# Patient Record
Sex: FEMALE | Race: WHITE
[De-identification: names, ages, dates, MRNs, and addresses within clinical notes are randomized per-mention and may not be internally consistent; named-entity substitution may affect disease eponyms.]

---

## 2019-10-22 RX ORDER — SODIUM PICOSULFATE, MAGNESIUM OXIDE, AND ANHYDROUS CITRIC ACID 10; 3.5; 12 MG/160ML; G/160ML; G/160ML
10-3.5-12 MG-GM LIQUID ORAL
Qty: 1 | Refills: 0 | Status: ACTIVE | COMMUNITY
Start: 2019-10-22 | End: 1900-01-01

## 2019-10-24 DIAGNOSIS — Z86.010 PERSONAL HISTORY OF COLONIC POLYPS: ICD-10-CM

## 2019-10-24 PROBLEM — Z86.018 HISTORY OF UTERINE LEIOMYOMA: Status: RESOLVED | Noted: 2019-10-24 | Resolved: 2019-10-24

## 2019-10-24 PROBLEM — H52.10 MYOPIA: Status: ACTIVE | Noted: 2019-10-24

## 2019-10-24 PROBLEM — R00.0 SINUS TACHYCARDIA: Status: ACTIVE | Noted: 2019-10-24

## 2019-10-24 PROBLEM — F41.9 ANXIETY: Status: ACTIVE | Noted: 2019-10-24

## 2019-10-24 PROBLEM — Z87.81 HISTORY OF FRACTURE OF ANKLE: Status: RESOLVED | Noted: 2019-10-24 | Resolved: 2019-10-24

## 2019-10-24 RX ORDER — ALENDRONATE SODIUM 70 MG/1
70 TABLET ORAL
Refills: 0 | Status: ACTIVE | COMMUNITY

## 2019-10-24 RX ORDER — ADHESIVE TAPE 3"X 2.3 YD
50 MCG TAPE, NON-MEDICATED TOPICAL
Refills: 0 | Status: ACTIVE | COMMUNITY

## 2019-10-28 ENCOUNTER — APPOINTMENT (OUTPATIENT)
Dept: SURGERY | Facility: CLINIC | Age: 69
End: 2019-10-28

## 2019-10-28 ENCOUNTER — APPOINTMENT (OUTPATIENT)
Dept: SURGERY | Facility: CLINIC | Age: 69
End: 2019-10-28
Payer: COMMERCIAL

## 2019-10-28 DIAGNOSIS — R00.0 TACHYCARDIA, UNSPECIFIED: ICD-10-CM

## 2019-10-28 DIAGNOSIS — H52.10 MYOPIA, UNSPECIFIED EYE: ICD-10-CM

## 2019-10-28 DIAGNOSIS — Z87.81 PERSONAL HISTORY OF (HEALED) TRAUMATIC FRACTURE: ICD-10-CM

## 2019-10-28 DIAGNOSIS — F41.9 ANXIETY DISORDER, UNSPECIFIED: ICD-10-CM

## 2019-10-28 DIAGNOSIS — Z86.018 PERSONAL HISTORY OF OTHER BENIGN NEOPLASM: ICD-10-CM

## 2019-10-28 PROCEDURE — 45385 COLONOSCOPY W/LESION REMOVAL: CPT

## 2022-10-12 DIAGNOSIS — Z12.11 ENCOUNTER FOR SCREENING FOR MALIGNANT NEOPLASM OF COLON: ICD-10-CM

## 2022-10-13 LAB — SARS-COV-2 N GENE NPH QL NAA+PROBE: NOT DETECTED

## 2022-10-14 RX ORDER — TAMOXIFEN CITRATE 20 MG/1
TABLET, FILM COATED ORAL
Refills: 0 | Status: ACTIVE | COMMUNITY

## 2022-10-14 RX ORDER — OXYCODONE HYDROCHLORIDE 30 MG/1
TABLET ORAL
Refills: 0 | Status: ACTIVE | COMMUNITY

## 2022-10-18 ENCOUNTER — APPOINTMENT (OUTPATIENT)
Dept: SURGERY | Facility: CLINIC | Age: 72
End: 2022-10-18

## 2022-10-18 PROCEDURE — 45378 DIAGNOSTIC COLONOSCOPY: CPT

## 2022-11-28 ENCOUNTER — INPATIENT (INPATIENT)
Facility: HOSPITAL | Age: 72
LOS: 2 days | Discharge: NURSING, REHAB OR LONG-TERM CARE | DRG: 871 | End: 2022-12-01
Attending: HOSPITALIST | Admitting: FAMILY MEDICINE
Payer: MEDICARE

## 2022-11-28 VITALS — WEIGHT: 138.01 LBS | HEIGHT: 63 IN

## 2022-11-28 DIAGNOSIS — Z90.710 ACQUIRED ABSENCE OF BOTH CERVIX AND UTERUS: Chronic | ICD-10-CM

## 2022-11-28 DIAGNOSIS — Z98.890 OTHER SPECIFIED POSTPROCEDURAL STATES: Chronic | ICD-10-CM

## 2022-11-28 DIAGNOSIS — J18.9 PNEUMONIA, UNSPECIFIED ORGANISM: ICD-10-CM

## 2022-11-28 LAB
ADD ON TEST-SPECIMEN IN LAB: SIGNIFICANT CHANGE UP
ALBUMIN SERPL ELPH-MCNC: 2.6 G/DL — LOW (ref 3.3–5)
ALP SERPL-CCNC: 37 U/L — LOW (ref 40–120)
ALT FLD-CCNC: 18 U/L — SIGNIFICANT CHANGE UP (ref 12–78)
ANION GAP SERPL CALC-SCNC: 3 MMOL/L — LOW (ref 5–17)
APPEARANCE UR: ABNORMAL
APTT BLD: 30 SEC — SIGNIFICANT CHANGE UP (ref 27.5–35.5)
AST SERPL-CCNC: 10 U/L — LOW (ref 15–37)
BASE EXCESS BLDV CALC-SCNC: 0.8 MMOL/L — SIGNIFICANT CHANGE UP
BASOPHILS # BLD AUTO: 0.08 K/UL — SIGNIFICANT CHANGE UP (ref 0–0.2)
BASOPHILS NFR BLD AUTO: 0.3 % — SIGNIFICANT CHANGE UP (ref 0–2)
BILIRUB SERPL-MCNC: 0.2 MG/DL — SIGNIFICANT CHANGE UP (ref 0.2–1.2)
BILIRUB UR-MCNC: NEGATIVE — SIGNIFICANT CHANGE UP
BUN SERPL-MCNC: 13 MG/DL — SIGNIFICANT CHANGE UP (ref 7–23)
CALCIUM SERPL-MCNC: 8.9 MG/DL — SIGNIFICANT CHANGE UP (ref 8.5–10.1)
CHLORIDE SERPL-SCNC: 100 MMOL/L — SIGNIFICANT CHANGE UP (ref 96–108)
CO2 BLDV-SCNC: 29 MMOL/L — HIGH (ref 22–26)
CO2 SERPL-SCNC: 31 MMOL/L — SIGNIFICANT CHANGE UP (ref 22–31)
COLOR SPEC: YELLOW — SIGNIFICANT CHANGE UP
CREAT SERPL-MCNC: 0.78 MG/DL — SIGNIFICANT CHANGE UP (ref 0.5–1.3)
DIFF PNL FLD: NEGATIVE — SIGNIFICANT CHANGE UP
EGFR: 82 ML/MIN/1.73M2 — SIGNIFICANT CHANGE UP
EOSINOPHIL # BLD AUTO: 0 K/UL — SIGNIFICANT CHANGE UP (ref 0–0.5)
EOSINOPHIL NFR BLD AUTO: 0 % — SIGNIFICANT CHANGE UP (ref 0–6)
GLUCOSE SERPL-MCNC: 157 MG/DL — HIGH (ref 70–99)
GLUCOSE UR QL: NEGATIVE — SIGNIFICANT CHANGE UP
HCO3 BLDV-SCNC: 27 MMOL/L — SIGNIFICANT CHANGE UP (ref 22–29)
HCT VFR BLD CALC: 38.2 % — SIGNIFICANT CHANGE UP (ref 34.5–45)
HGB BLD-MCNC: 12.5 G/DL — SIGNIFICANT CHANGE UP (ref 11.5–15.5)
IMM GRANULOCYTES NFR BLD AUTO: 0.5 % — SIGNIFICANT CHANGE UP (ref 0–0.9)
INR BLD: 1.23 RATIO — HIGH (ref 0.88–1.16)
KETONES UR-MCNC: NEGATIVE — SIGNIFICANT CHANGE UP
LACTATE SERPL-SCNC: 2 MMOL/L — SIGNIFICANT CHANGE UP (ref 0.7–2)
LACTATE SERPL-SCNC: 2.8 MMOL/L — HIGH (ref 0.7–2)
LEUKOCYTE ESTERASE UR-ACNC: ABNORMAL
LYMPHOCYTES # BLD AUTO: 1.06 K/UL — SIGNIFICANT CHANGE UP (ref 1–3.3)
LYMPHOCYTES # BLD AUTO: 4.3 % — LOW (ref 13–44)
MCHC RBC-ENTMCNC: 31.5 PG — SIGNIFICANT CHANGE UP (ref 27–34)
MCHC RBC-ENTMCNC: 32.7 GM/DL — SIGNIFICANT CHANGE UP (ref 32–36)
MCV RBC AUTO: 96.2 FL — SIGNIFICANT CHANGE UP (ref 80–100)
MONOCYTES # BLD AUTO: 2.27 K/UL — HIGH (ref 0–0.9)
MONOCYTES NFR BLD AUTO: 9.2 % — SIGNIFICANT CHANGE UP (ref 2–14)
NEUTROPHILS # BLD AUTO: 21.05 K/UL — HIGH (ref 1.8–7.4)
NEUTROPHILS NFR BLD AUTO: 85.7 % — HIGH (ref 43–77)
NITRITE UR-MCNC: NEGATIVE — SIGNIFICANT CHANGE UP
PCO2 BLDV: 49 MMHG — HIGH (ref 39–42)
PH BLDV: 7.35 — SIGNIFICANT CHANGE UP (ref 7.32–7.43)
PH UR: 6.5 — SIGNIFICANT CHANGE UP (ref 5–8)
PLATELET # BLD AUTO: 332 K/UL — SIGNIFICANT CHANGE UP (ref 150–400)
PO2 BLDV: 37 MMHG — SIGNIFICANT CHANGE UP
POTASSIUM SERPL-MCNC: 4.5 MMOL/L — SIGNIFICANT CHANGE UP (ref 3.5–5.3)
POTASSIUM SERPL-SCNC: 4.5 MMOL/L — SIGNIFICANT CHANGE UP (ref 3.5–5.3)
PROT SERPL-MCNC: 6.2 GM/DL — SIGNIFICANT CHANGE UP (ref 6–8.3)
PROT UR-MCNC: 15
PROTHROM AB SERPL-ACNC: 14.3 SEC — HIGH (ref 10.5–13.4)
RBC # BLD: 3.97 M/UL — SIGNIFICANT CHANGE UP (ref 3.8–5.2)
RBC # FLD: 12.9 % — SIGNIFICANT CHANGE UP (ref 10.3–14.5)
SAO2 % BLDV: 69.8 % — SIGNIFICANT CHANGE UP
SODIUM SERPL-SCNC: 134 MMOL/L — LOW (ref 135–145)
SP GR SPEC: 1 — LOW (ref 1.01–1.02)
UROBILINOGEN FLD QL: NEGATIVE — SIGNIFICANT CHANGE UP
WBC # BLD: 24.59 K/UL — HIGH (ref 3.8–10.5)
WBC # FLD AUTO: 24.59 K/UL — HIGH (ref 3.8–10.5)

## 2022-11-28 PROCEDURE — 81001 URINALYSIS AUTO W/SCOPE: CPT

## 2022-11-28 PROCEDURE — 80048 BASIC METABOLIC PNL TOTAL CA: CPT

## 2022-11-28 PROCEDURE — 87086 URINE CULTURE/COLONY COUNT: CPT

## 2022-11-28 PROCEDURE — 83605 ASSAY OF LACTIC ACID: CPT

## 2022-11-28 PROCEDURE — 85025 COMPLETE CBC W/AUTO DIFF WBC: CPT

## 2022-11-28 PROCEDURE — 93010 ELECTROCARDIOGRAM REPORT: CPT

## 2022-11-28 PROCEDURE — 97162 PT EVAL MOD COMPLEX 30 MIN: CPT | Mod: GP

## 2022-11-28 PROCEDURE — 36415 COLL VENOUS BLD VENIPUNCTURE: CPT

## 2022-11-28 PROCEDURE — 82803 BLOOD GASES ANY COMBINATION: CPT

## 2022-11-28 PROCEDURE — 97116 GAIT TRAINING THERAPY: CPT | Mod: GP

## 2022-11-28 PROCEDURE — 71275 CT ANGIOGRAPHY CHEST: CPT | Mod: 26,MD

## 2022-11-28 PROCEDURE — 70450 CT HEAD/BRAIN W/O DYE: CPT | Mod: 26,MD

## 2022-11-28 PROCEDURE — 83036 HEMOGLOBIN GLYCOSYLATED A1C: CPT

## 2022-11-28 PROCEDURE — 86803 HEPATITIS C AB TEST: CPT

## 2022-11-28 PROCEDURE — 99223 1ST HOSP IP/OBS HIGH 75: CPT

## 2022-11-28 PROCEDURE — 99285 EMERGENCY DEPT VISIT HI MDM: CPT | Mod: CS

## 2022-11-28 RX ORDER — SODIUM CHLORIDE 9 MG/ML
2000 INJECTION INTRAMUSCULAR; INTRAVENOUS; SUBCUTANEOUS ONCE
Refills: 0 | Status: COMPLETED | OUTPATIENT
Start: 2022-11-28 | End: 2022-11-28

## 2022-11-28 RX ORDER — SODIUM CHLORIDE 9 MG/ML
1000 INJECTION INTRAMUSCULAR; INTRAVENOUS; SUBCUTANEOUS
Refills: 0 | Status: DISCONTINUED | OUTPATIENT
Start: 2022-11-28 | End: 2022-12-01

## 2022-11-28 RX ORDER — LANOLIN ALCOHOL/MO/W.PET/CERES
3 CREAM (GRAM) TOPICAL AT BEDTIME
Refills: 0 | Status: DISCONTINUED | OUTPATIENT
Start: 2022-11-28 | End: 2022-12-01

## 2022-11-28 RX ORDER — VANCOMYCIN HCL 1 G
1000 VIAL (EA) INTRAVENOUS ONCE
Refills: 0 | Status: COMPLETED | OUTPATIENT
Start: 2022-11-28 | End: 2022-11-28

## 2022-11-28 RX ORDER — POLYETHYLENE GLYCOL 3350 17 G/17G
17 POWDER, FOR SOLUTION ORAL DAILY
Refills: 0 | Status: DISCONTINUED | OUTPATIENT
Start: 2022-11-28 | End: 2022-12-01

## 2022-11-28 RX ORDER — GABAPENTIN 400 MG/1
800 CAPSULE ORAL DAILY
Refills: 0 | Status: DISCONTINUED | OUTPATIENT
Start: 2022-11-28 | End: 2022-12-01

## 2022-11-28 RX ORDER — OXYCODONE HYDROCHLORIDE 5 MG/1
15 TABLET ORAL
Refills: 0 | Status: DISCONTINUED | OUTPATIENT
Start: 2022-11-28 | End: 2022-12-01

## 2022-11-28 RX ORDER — CEFTRIAXONE 500 MG/1
1000 INJECTION, POWDER, FOR SOLUTION INTRAMUSCULAR; INTRAVENOUS EVERY 24 HOURS
Refills: 0 | Status: DISCONTINUED | OUTPATIENT
Start: 2022-11-28 | End: 2022-12-01

## 2022-11-28 RX ORDER — ENOXAPARIN SODIUM 100 MG/ML
40 INJECTION SUBCUTANEOUS EVERY 24 HOURS
Refills: 0 | Status: DISCONTINUED | OUTPATIENT
Start: 2022-11-28 | End: 2022-12-01

## 2022-11-28 RX ORDER — OFLOXACIN 0.3 %
1 DROPS OPHTHALMIC (EYE)
Refills: 0 | Status: DISCONTINUED | OUTPATIENT
Start: 2022-11-28 | End: 2022-12-01

## 2022-11-28 RX ORDER — SENNA PLUS 8.6 MG/1
2 TABLET ORAL AT BEDTIME
Refills: 0 | Status: DISCONTINUED | OUTPATIENT
Start: 2022-11-28 | End: 2022-12-01

## 2022-11-28 RX ORDER — AZITHROMYCIN 500 MG/1
500 TABLET, FILM COATED ORAL EVERY 24 HOURS
Refills: 0 | Status: COMPLETED | OUTPATIENT
Start: 2022-11-28 | End: 2022-11-30

## 2022-11-28 RX ORDER — ACETAMINOPHEN 500 MG
650 TABLET ORAL EVERY 6 HOURS
Refills: 0 | Status: DISCONTINUED | OUTPATIENT
Start: 2022-11-28 | End: 2022-12-01

## 2022-11-28 RX ORDER — TAMOXIFEN CITRATE 20 MG/1
20 TABLET, FILM COATED ORAL AT BEDTIME
Refills: 0 | Status: DISCONTINUED | OUTPATIENT
Start: 2022-11-28 | End: 2022-12-01

## 2022-11-28 RX ORDER — LACTOBACILLUS ACIDOPHILUS 100MM CELL
1 CAPSULE ORAL DAILY
Refills: 0 | Status: DISCONTINUED | OUTPATIENT
Start: 2022-11-28 | End: 2022-12-01

## 2022-11-28 RX ORDER — ERGOCALCIFEROL 1.25 MG/1
50000 CAPSULE ORAL
Refills: 0 | Status: DISCONTINUED | OUTPATIENT
Start: 2022-11-28 | End: 2022-12-01

## 2022-11-28 RX ORDER — ONDANSETRON 8 MG/1
4 TABLET, FILM COATED ORAL EVERY 8 HOURS
Refills: 0 | Status: DISCONTINUED | OUTPATIENT
Start: 2022-11-28 | End: 2022-12-01

## 2022-11-28 RX ORDER — PIPERACILLIN AND TAZOBACTAM 4; .5 G/20ML; G/20ML
3.38 INJECTION, POWDER, LYOPHILIZED, FOR SOLUTION INTRAVENOUS ONCE
Refills: 0 | Status: COMPLETED | OUTPATIENT
Start: 2022-11-28 | End: 2022-11-28

## 2022-11-28 RX ORDER — GABAPENTIN 400 MG/1
0 CAPSULE ORAL
Qty: 0 | Refills: 0 | DISCHARGE

## 2022-11-28 RX ORDER — CELECOXIB 200 MG/1
200 CAPSULE ORAL DAILY
Refills: 0 | Status: DISCONTINUED | OUTPATIENT
Start: 2022-11-28 | End: 2022-12-01

## 2022-11-28 RX ORDER — DIAZEPAM 5 MG
10 TABLET ORAL EVERY 8 HOURS
Refills: 0 | Status: DISCONTINUED | OUTPATIENT
Start: 2022-11-28 | End: 2022-12-01

## 2022-11-28 RX ADMIN — Medication 250 MILLIGRAM(S): at 16:34

## 2022-11-28 RX ADMIN — PIPERACILLIN AND TAZOBACTAM 200 GRAM(S): 4; .5 INJECTION, POWDER, LYOPHILIZED, FOR SOLUTION INTRAVENOUS at 15:13

## 2022-11-28 RX ADMIN — CEFTRIAXONE 1000 MILLIGRAM(S): 500 INJECTION, POWDER, FOR SOLUTION INTRAMUSCULAR; INTRAVENOUS at 23:15

## 2022-11-28 RX ADMIN — SENNA PLUS 2 TABLET(S): 8.6 TABLET ORAL at 23:04

## 2022-11-28 RX ADMIN — TAMOXIFEN CITRATE 20 MILLIGRAM(S): 20 TABLET, FILM COATED ORAL at 23:06

## 2022-11-28 RX ADMIN — SODIUM CHLORIDE 1000 MILLILITER(S): 9 INJECTION INTRAMUSCULAR; INTRAVENOUS; SUBCUTANEOUS at 15:13

## 2022-11-28 RX ADMIN — Medication 10 MILLIGRAM(S): at 23:04

## 2022-11-28 RX ADMIN — ENOXAPARIN SODIUM 40 MILLIGRAM(S): 100 INJECTION SUBCUTANEOUS at 23:06

## 2022-11-28 RX ADMIN — SODIUM CHLORIDE 100 MILLILITER(S): 9 INJECTION INTRAMUSCULAR; INTRAVENOUS; SUBCUTANEOUS at 22:04

## 2022-11-28 NOTE — H&P ADULT - HISTORY OF PRESENT ILLNESS
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 Pt had a cold for 8 days last week and today she was feeling more weak. She had an episode of vomiting yesterday. Her  took her SpO2 which ranged from 55-78% at home and her HR ranged from . Her fingernails and lips were purple. Today she had increasing weakness, chills, sweats, subjective fevers, nausea, headache, abdominal distension. She also has pain in the left breast where her lumpectomy was completed and se feels a lump in that area, she will be having a mammogram on December 30th. She has a history of right hip bursitis and is s/p multiple epidural injections and sees pain management. Her  is disabled and cannot take care of her at home and would like for her to be discharged to [facility] after this admission. Denies chest pain, SOB, abdominal pain, N/V, diarrhea/constipation.     ER course: -120, SpO2 85% on room air -> 95% on 3L nasal cannula. Labs: WBC 24.59, neutrophils 85.7%, INR 1.23, lactate 2.8 -> 2.5, Na 134, glucose 157, albumin 2.6. COVID and RVP negative. EKG: Sinus tachycardia with  bom, normal intervals, no ST segment changes, no T wave inversions (personally reviewed).     Imaging:   - CTA chest: 1. No pulmonary thromboembolism. 2. Consolidations in the right middle lobe and left lower lobe which may represent pneumonia in the appropriate setting. Additional areas of atelectasis and/or pneumonia in the lower lungs. A repeat chest CT scan is recommended in 4-6 weeks following treatment to assess for resolution  - CT head: No acute hemorrhage mass or mass effect.    Pt was given Zosyn and Vancomycin, 2L of NS. She is being admitted to med/surg for further management.

## 2022-11-28 NOTE — PATIENT PROFILE ADULT - CENTRAL VENOUS CATHETER/PICC LINE
Has The Cancer Been Biopsied Before?: has been previously biopsied Accession (Optional): [id number] Who Is Your Referring Provider?: Rebecca Rodriguez Np When Was Your Biopsy?: 09/24/2019 no

## 2022-11-28 NOTE — H&P ADULT - ASSESSMENT
71 y/o F presented with low pulse ox     1. Sepsis and acute hypoxic respiratory distress secondary to PNA (r/o bacteremia, UTI)   - Admit to med/surg w/ continuous pulse ox oxygen  - -120, WBC 24.59, lactate 2.8 -> 2.5   - SpO2 85% on room air -> 95% on 3L nasal cannula  - c/w supplemental O2 to maintain SpO2 > 92%   - s/p Zosyn and Vancomycin in ER; c/w Ceftriaxone and Azithromycin   - f/u UCx, BCx x 2, sputum culture, reflex lactic acid; adjust abx based on sensitivities  - CTA: consolidations right middle lobe and left lower lobe suggesting PNA   - Repeat CT chest in 4-6 weeks to assess for resolution   - Trend WBC, monitor for temperatures   - Tylenol for temperatures PRN   - s/p 2L of NS, c/w 100 ml/hr (monitor for fluid overload)   - Monitor BP closely     2. Hyperglycemia   - Glucose 157, monitor closely   - Ordered HbA1c     3. Abdominal distension likely secondary to constipation in the setting of opiate use   - Pt on oxycodone 5 times a day at home, not on a bowel regimen   - Ordered miralax, senna, suppository PRN   - If worsening abdominal distension, worsening pain, or increasing lactic acid will order CT abd/pelvis for further evaluation     4. Left sided breast pain at prior lumpectomy site   - f/u with Oncology and Breast surgeon outpt   - Pt has mammogram scheduled 12/30/22     5. Hypoalbuminemia   - Albumin 2.6   - Nutrition consult     6. History of breast cancer s/p resection, hysterectomy, colon polyps, PNA, diverticular disease, hemorrhoids, anxiety, uterine fibroids  - c/w home medications; verified with pt at the bedside   - Case management/social work consults/PT evaluation     DVT ppx: Lovenox 40 mg subcutaneous daily  Code status: Full code (pt agrees to chest compressions and intubation if required).   Emergency contact/HCP: Alexx Church [phone] 71 y/o F presented with low pulse ox     1. Sepsis and acute hypoxic respiratory distress secondary to PNA (r/o bacteremia, UTI)   - Admit to med/surg w/ continuous pulse ox oxygen  - -120, WBC 24.59, lactate 2.8 -> 2.5   - SpO2 85% on room air -> 95% on 3L nasal cannula  - c/w supplemental O2 to maintain SpO2 > 92%   - s/p Zosyn and Vancomycin in ER; c/w Ceftriaxone and Azithromycin   - f/u UCx, BCx x 2, sputum culture, reflex lactic acid; adjust abx based on sensitivities  - CTA: consolidations right middle lobe and left lower lobe suggesting PNA   - Repeat CT chest in 4-6 weeks to assess for resolution   - Trend WBC, monitor for temperatures   - Tylenol for temperatures PRN   - s/p 2L of NS, c/w 100 ml/hr (monitor for fluid overload)   - Monitor BP closely     2. Hyperglycemia   - Glucose 157, monitor closely   - Ordered HbA1c     3. Abdominal distension likely secondary to constipation in the setting of opiate use   - Pt on oxycodone 5 times a day at home, not on a bowel regimen   - Ordered miralax, senna, suppository PRN   - If worsening abdominal distension, worsening pain, or increasing lactic acid will order CT abd/pelvis for further evaluation     4. Left sided breast pain at prior lumpectomy site   - f/u with Oncology and Breast surgeon outpt   - Pt has mammogram scheduled 12/30/22     5. Bacterial conjunctivitis   - Start Ofloxacin     6. Hypoalbuminemia   - Albumin 2.6   - Nutrition consult     7. History of breast cancer s/p resection, hysterectomy, colon polyps, PNA, diverticular disease, hemorrhoids, anxiety, uterine fibroids  - c/w home medications; verified with pt at the bedside   - Case management/social work consults/PT evaluation     DVT ppx: Lovenox 40 mg subcutaneous daily  Code status: Full code (pt agrees to chest compressions and intubation if required).   Emergency contact/HCP: Alexx Church [phone]

## 2022-11-28 NOTE — H&P ADULT - NSICDXPASTSURGICALHX_GEN_ALL_CORE_FT
PAST SURGICAL HISTORY:  H/O lumpectomy     History of ankle surgery     S/P hysterectomy     S/P myomectomy

## 2022-11-28 NOTE — ED ADULT TRIAGE NOTE - CHIEF COMPLAINT QUOTE
sick x8 days. presents today from home with oxygen levels between 50-80% and HR between .  reports nails were turning purple and circumoral cyanosis noted at home. HR in triage 118, SpO2 82%. + lethargy. pt c/o back pain, anox3 in triage.

## 2022-11-28 NOTE — ED ADULT NURSE REASSESSMENT NOTE - NS ED NURSE REASSESS COMMENT FT1
received pt from RICHARD Pappas. pt resting comfortably in stretcher with  at bedside. pt and  updated on plan of care. pt remains on NC 3L O2 with O2 sat 93-96%.

## 2022-11-28 NOTE — H&P ADULT - NSHPSOCIALHISTORY_GEN_ALL_CORE
Lives with her . Used to be independent with ADLs and IADLs but has been requiring increasing assistance. Denies smoking, alcohol, drug use.

## 2022-11-28 NOTE — ED PROVIDER NOTE - CONSTITUTIONAL NEGATIVE STATEMENT, MLM
Size Of Lesion In Cm (Optional): 2 X Size Of Lesion In Cm (Optional): 0 Detail Level: Detailed no fever and no chills.

## 2022-11-28 NOTE — H&P ADULT - NSHPOUTPATIENTPROVIDERS_GEN_ALL_CORE
PCP/Cardiologist - Dr. Sarwat To   GP - Dr. Maldonado   Pain management - Dr. Baires   Heme/Onc physicians at [facility] -> Breast surgeon Dr. Zuleta, Heme/Onc - Dr. Lombardo, Radiation oncologist - Dr. Marin

## 2022-11-28 NOTE — ED PROVIDER NOTE - PROGRESS NOTE DETAILS
Zamzam Jonas for attending Dr. Anthony:  full 30cc per kg fluid not yet given yet as pt has hx of hypoxia and also clinically having a lot of crackles, will reassess, if tolerating might give more fluid hydration Zamzam Jonas for attending Dr. Anthony:  full 30cc per kg fluid not yet given yet as pt has hx of hypoxia and also clinically having a lot of crackles, will reassess, if tolerating might give more fluid hydration. Gabrielle MESSINA: Spoke with ICU PA/Dr. Paulson. Patient is safe for floor admission. No CICU or ICU needed. s/p abx, fluid, trending lactate. Patient is nontoxic appearing. Spoke with Dr. Duncan. Hospitalist admission appreciated.

## 2022-11-28 NOTE — ED STATDOCS - PROGRESS NOTE DETAILS
Gabrielle MESSINA: Spoke with ICU PA/Dr. Paulson. Patient is safe for flood admission. No CICU or ICU needed. s/p abx, fluid, trending lactate. Patient is nontoxic appearing. Spoke with Dr. Duncan. Hospitalist admission appreciated.

## 2022-11-28 NOTE — ED PROVIDER NOTE - GASTROINTESTINAL, MLM
Abdomen soft, non-tender, no guarding. Abd mildly distended. Abdomen soft, non-tender, no guarding. Abd mildly distended. BS+

## 2022-11-28 NOTE — ED PROVIDER NOTE - CARDIAC, MLM
Tachycardic, regular rhythm.  Heart sounds S1, S2.  No murmurs, rubs or gallops. Mild LE swelling Tachycardic, regular rhythm.  Heart sounds S1, S2.  No rubs or gallops. Mild LE swelling. 2+ pules in bilateral dp and radial arteries.

## 2022-11-28 NOTE — ED PROVIDER NOTE - CLINICAL SUMMARY MEDICAL DECISION MAKING FREE TEXT BOX
Pt w/ hypoxia and weakness, breast CA immunocompromised and tachycardic today w/ cyanosis alleviated w/ oxygenation. Differential includes PE, PNA, worsening CA load, and sepsis. Spoke w/ pt and spouse who are agreeable for prophylactic ABx treatment, CT, and admission.

## 2022-11-28 NOTE — ED PROVIDER NOTE - MUSCULOSKELETAL, MLM
Spine appears normal, range of motion is not limited, no muscle or joint tenderness Spine appears normal, range of motion is not limited, no muscle or joint tenderness. 5/5 strength on flexion and extensions.

## 2022-11-28 NOTE — ED ADULT NURSE NOTE - OBJECTIVE STATEMENT
71 y/o female awake alert and oriented x4 presents to ED c/o hypoxia and SOB. As per pt's  at bedside, pt was sating 55%-80% on RA at home with HR between .  at bedside reports pt nails were turning blue and extremities were cold. Pt states sx is similar to last time pt had PNA. Denies chest pain, cough, fever/chills. hx breast ca s/p resection, hysterectomy, PNA.

## 2022-11-28 NOTE — H&P ADULT - NSICDXFAMILYHX_GEN_ALL_CORE_FT
FAMILY HISTORY:  Father  Still living? Unknown  FH: CAD (coronary artery disease), Age at diagnosis: Age Unknown    Mother  Still living? Unknown  FH: rheumatic heart disease, Age at diagnosis: Age Unknown    Grandparent  Still living? Unknown  FH: breast cancer, Age at diagnosis: Age Unknown

## 2022-11-28 NOTE — H&P ADULT - NSICDXPASTMEDICALHX_GEN_ALL_CORE_FT
PAST MEDICAL HISTORY:  Bursitis of right hip     Colon polyps     Diverticular disease     H/O hemorrhoids     History of hemorrhoids     Pneumonia     Uterine fibroid       Breast CA left breast s/p lumpectomy on immunotherapy, no chemotherapy or radiation therapy

## 2022-11-28 NOTE — H&P ADULT - NSHPPHYSICALEXAM_GEN_ALL_CORE
ICU Vital Signs Last 24 Hrs  T(C): 36.9 (28 Nov 2022 21:03), Max: 36.9 (28 Nov 2022 21:03)  T(F): 98.4 (28 Nov 2022 21:03), Max: 98.4 (28 Nov 2022 21:03)  HR: 92 (28 Nov 2022 21:03) (92 - 120)  BP: 108/76 (28 Nov 2022 21:03) (104/65 - 108/76)  BP(mean): 78 (28 Nov 2022 15:15) (70 - 82)  RR: 22 (28 Nov 2022 21:03) (16 - 22)  SpO2: 94% (28 Nov 2022 21:03) (85% - 100%)    O2 Parameters below as of 28 Nov 2022 21:03  Patient On (Oxygen Delivery Method): nasal cannula  O2 Flow (L/min): 3    General: Awake and alert, cooperative with exam. No acute distress.   Skin: Warm, dry, and pink.   Eyes: Pupils equal and reactive to light. Extraocular eye movements intact. No conjunctival injection, discharge, or scleral icterus.   HEENT: Atraumatic, normocephalic. Moist mucus membranes.   Breast: left breast with incision clean/dry/intact. Small lump felt under incision likely suture. No other breast masses appreciated on palpation.   Cardiology: Normal S1, S2. No murmurs, rubs, or gallops. Regular rate and rhythm.   Respiratory: Rhonchi diffusely throughout the lung fields. Normal chest expansion. No accessory muscle use.   Gastrointestinal: Positive bowel sounds. Soft, non-tender. + distension. No guarding, rigidity, or rebound tenderness. No hepatosplenomegaly.   Musculoskeletal: 5/5 motor strength in all extremities. Normal range of motion.   Extremities: No peripheral edema bilaterally. Dorsalis pedis pulses 2+ bilaterally.   Neurological: A+Ox3 (person, place, and time). Cranial nerves 2-12 intact. Normal speech. No facial droop. No focal neurological deficits.   Psychiatric: Normal affect. Normal mood. ICU Vital Signs Last 24 Hrs  T(C): 36.9 (28 Nov 2022 21:03), Max: 36.9 (28 Nov 2022 21:03)  T(F): 98.4 (28 Nov 2022 21:03), Max: 98.4 (28 Nov 2022 21:03)  HR: 92 (28 Nov 2022 21:03) (92 - 120)  BP: 108/76 (28 Nov 2022 21:03) (104/65 - 108/76)  BP(mean): 78 (28 Nov 2022 15:15) (70 - 82)  RR: 22 (28 Nov 2022 21:03) (16 - 22)  SpO2: 94% (28 Nov 2022 21:03) (85% - 100%)    O2 Parameters below as of 28 Nov 2022 21:03  Patient On (Oxygen Delivery Method): nasal cannula  O2 Flow (L/min): 3    General: Awake and alert, cooperative with exam. No acute distress.   Skin: Warm, dry, and pink.   Eyes: Pupils equal and reactive to light. Extraocular eye movements intact. Minimal conjunctival erythema, b/l green discharge. No scleral icterus.   HEENT: Atraumatic, normocephalic. Moist mucus membranes.   Breast: left breast with incision clean/dry/intact. Small lump felt under incision likely suture. No other breast masses appreciated on palpation.   Cardiology: Normal S1, S2. No murmurs, rubs, or gallops. Regular rate and rhythm.   Respiratory: Rhonchi diffusely throughout the lung fields. Normal chest expansion. No accessory muscle use.   Gastrointestinal: Positive bowel sounds. Soft, non-tender. + distension. No guarding, rigidity, or rebound tenderness. No hepatosplenomegaly.   Musculoskeletal: 5/5 motor strength in all extremities. Normal range of motion.   Extremities: No peripheral edema bilaterally. Dorsalis pedis pulses 2+ bilaterally.   Neurological: A+Ox3 (person, place, and time). Cranial nerves 2-12 intact. Normal speech. No facial droop. No focal neurological deficits.   Psychiatric: Normal affect. Normal mood.

## 2022-11-28 NOTE — ED PROVIDER NOTE - NS_ ATTENDINGSCRIBEDETAILS _ED_A_ED_FT
I Sandeep Anthony MD saw and examined the patient. Scribe documented for me and under my supervision. I have modified the scribe's documentation where necessary to reflect my history, physical exam and other relevant documentations pertinent to the care of the patient.

## 2022-11-28 NOTE — ED PROVIDER NOTE - OBJECTIVE STATEMENT
73 y/o female w/ a PMHx of breast CA s/p resection, hysterectomy, colon polyp, PNA, diverticular disease, internal hemorrhoids, myopia, anxiety, post concussion syndrome, and uterine leiomyoma presents to the ED for hypoxia. Pt reports O2 level at home were between 50-80% and HR was between  so came to the ED for eval. Pt reports pt nails were turning purple, cold extremities, and pt was noted to have circumoral cyanosis at home. Pt  states Sx are similar to when pt had PNA. Pt  notes pt has been sick w/ a URI for 1 week, tested negative on at home COVID test. No other complaints at this time. NKDA. PCP/Cardio: Dr. Santamaria.

## 2022-11-29 LAB
A1C WITH ESTIMATED AVERAGE GLUCOSE RESULT: 5.8 % — HIGH (ref 4–5.6)
ANION GAP SERPL CALC-SCNC: 4 MMOL/L — LOW (ref 5–17)
BASOPHILS # BLD AUTO: 0.04 K/UL — SIGNIFICANT CHANGE UP (ref 0–0.2)
BASOPHILS NFR BLD AUTO: 0.3 % — SIGNIFICANT CHANGE UP (ref 0–2)
BUN SERPL-MCNC: 6 MG/DL — LOW (ref 7–23)
CALCIUM SERPL-MCNC: 8 MG/DL — LOW (ref 8.5–10.1)
CHLORIDE SERPL-SCNC: 104 MMOL/L — SIGNIFICANT CHANGE UP (ref 96–108)
CO2 SERPL-SCNC: 27 MMOL/L — SIGNIFICANT CHANGE UP (ref 22–31)
CREAT SERPL-MCNC: 0.4 MG/DL — LOW (ref 0.5–1.3)
EGFR: 106 ML/MIN/1.73M2 — SIGNIFICANT CHANGE UP
EOSINOPHIL # BLD AUTO: 0.07 K/UL — SIGNIFICANT CHANGE UP (ref 0–0.5)
EOSINOPHIL NFR BLD AUTO: 0.5 % — SIGNIFICANT CHANGE UP (ref 0–6)
ESTIMATED AVERAGE GLUCOSE: 120 MG/DL — HIGH (ref 68–114)
GLUCOSE SERPL-MCNC: 120 MG/DL — HIGH (ref 70–99)
HCT VFR BLD CALC: 31 % — LOW (ref 34.5–45)
HCV AB S/CO SERPL IA: 0.09 S/CO — SIGNIFICANT CHANGE UP (ref 0–0.99)
HCV AB SERPL-IMP: SIGNIFICANT CHANGE UP
HGB BLD-MCNC: 10.1 G/DL — LOW (ref 11.5–15.5)
IMM GRANULOCYTES NFR BLD AUTO: 0.4 % — SIGNIFICANT CHANGE UP (ref 0–0.9)
LYMPHOCYTES # BLD AUTO: 1.92 K/UL — SIGNIFICANT CHANGE UP (ref 1–3.3)
LYMPHOCYTES # BLD AUTO: 14.5 % — SIGNIFICANT CHANGE UP (ref 13–44)
MCHC RBC-ENTMCNC: 31.3 PG — SIGNIFICANT CHANGE UP (ref 27–34)
MCHC RBC-ENTMCNC: 32.6 GM/DL — SIGNIFICANT CHANGE UP (ref 32–36)
MCV RBC AUTO: 96 FL — SIGNIFICANT CHANGE UP (ref 80–100)
MONOCYTES # BLD AUTO: 1.28 K/UL — HIGH (ref 0–0.9)
MONOCYTES NFR BLD AUTO: 9.7 % — SIGNIFICANT CHANGE UP (ref 2–14)
NEUTROPHILS # BLD AUTO: 9.84 K/UL — HIGH (ref 1.8–7.4)
NEUTROPHILS NFR BLD AUTO: 74.6 % — SIGNIFICANT CHANGE UP (ref 43–77)
PLATELET # BLD AUTO: 260 K/UL — SIGNIFICANT CHANGE UP (ref 150–400)
POTASSIUM SERPL-MCNC: 3.7 MMOL/L — SIGNIFICANT CHANGE UP (ref 3.5–5.3)
POTASSIUM SERPL-SCNC: 3.7 MMOL/L — SIGNIFICANT CHANGE UP (ref 3.5–5.3)
RBC # BLD: 3.23 M/UL — LOW (ref 3.8–5.2)
RBC # FLD: 13.2 % — SIGNIFICANT CHANGE UP (ref 10.3–14.5)
SODIUM SERPL-SCNC: 135 MMOL/L — SIGNIFICANT CHANGE UP (ref 135–145)
WBC # BLD: 13.2 K/UL — HIGH (ref 3.8–10.5)
WBC # FLD AUTO: 13.2 K/UL — HIGH (ref 3.8–10.5)

## 2022-11-29 PROCEDURE — 99232 SBSQ HOSP IP/OBS MODERATE 35: CPT

## 2022-11-29 RX ADMIN — CELECOXIB 200 MILLIGRAM(S): 200 CAPSULE ORAL at 10:19

## 2022-11-29 RX ADMIN — CELECOXIB 200 MILLIGRAM(S): 200 CAPSULE ORAL at 11:15

## 2022-11-29 RX ADMIN — OXYCODONE HYDROCHLORIDE 15 MILLIGRAM(S): 5 TABLET ORAL at 06:31

## 2022-11-29 RX ADMIN — AZITHROMYCIN 255 MILLIGRAM(S): 500 TABLET, FILM COATED ORAL at 00:15

## 2022-11-29 RX ADMIN — OXYCODONE HYDROCHLORIDE 15 MILLIGRAM(S): 5 TABLET ORAL at 00:45

## 2022-11-29 RX ADMIN — OXYCODONE HYDROCHLORIDE 15 MILLIGRAM(S): 5 TABLET ORAL at 00:15

## 2022-11-29 RX ADMIN — Medication 1 TABLET(S): at 10:19

## 2022-11-29 RX ADMIN — ENOXAPARIN SODIUM 40 MILLIGRAM(S): 100 INJECTION SUBCUTANEOUS at 22:17

## 2022-11-29 RX ADMIN — Medication 10 MILLIGRAM(S): at 22:17

## 2022-11-29 RX ADMIN — Medication 1 DROP(S): at 18:04

## 2022-11-29 RX ADMIN — Medication 1 DROP(S): at 00:15

## 2022-11-29 RX ADMIN — OXYCODONE HYDROCHLORIDE 15 MILLIGRAM(S): 5 TABLET ORAL at 11:15

## 2022-11-29 RX ADMIN — OXYCODONE HYDROCHLORIDE 15 MILLIGRAM(S): 5 TABLET ORAL at 15:20

## 2022-11-29 RX ADMIN — SENNA PLUS 2 TABLET(S): 8.6 TABLET ORAL at 22:17

## 2022-11-29 RX ADMIN — Medication 1 DROP(S): at 11:43

## 2022-11-29 RX ADMIN — CEFTRIAXONE 1000 MILLIGRAM(S): 500 INJECTION, POWDER, FOR SOLUTION INTRAMUSCULAR; INTRAVENOUS at 22:18

## 2022-11-29 RX ADMIN — Medication 1 DROP(S): at 05:18

## 2022-11-29 RX ADMIN — OXYCODONE HYDROCHLORIDE 15 MILLIGRAM(S): 5 TABLET ORAL at 16:18

## 2022-11-29 RX ADMIN — OXYCODONE HYDROCHLORIDE 15 MILLIGRAM(S): 5 TABLET ORAL at 10:20

## 2022-11-29 RX ADMIN — POLYETHYLENE GLYCOL 3350 17 GRAM(S): 17 POWDER, FOR SOLUTION ORAL at 10:19

## 2022-11-29 RX ADMIN — Medication 10 MILLIGRAM(S): at 05:18

## 2022-11-29 NOTE — PHYSICAL THERAPY INITIAL EVALUATION ADULT - PERTINENT HX OF CURRENT PROBLEM, REHAB EVAL
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 Pt had a cold for 8 days last week and today she was feeling more weak. She had an episode of vomiting yesterday. Her  took her SpO2 which ranged from 55-78% at home and her HR ranged from . Her fingernails and lips were purple. Today she had increasing weakness, chills, sweats, subjective fevers, nausea, headache, abdominal distension. She also has pain in the left breast where her lumpectomy was completed and se feels a lump in that area, she will be having a mammogram on December 30th. She has a history of right hip bursitis and is s/p multiple epidural injections and sees pain management. Her  is disabled and cannot take care of her at home and would like for her to be discharged to [facility] after this admission.

## 2022-11-29 NOTE — PROGRESS NOTE ADULT - SUBJECTIVE AND OBJECTIVE BOX
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 Pt had a cold for 8 days last week and today she was feeling more weak. She had an episode of vomiting yesterday. Her  took her SpO2 which ranged from 55-78% at home and her HR ranged from . Her fingernails and lips were purple. Today she had increasing weakness, chills, sweats, subjective fevers, nausea, headache, abdominal distension. She also has pain in the left breast where her lumpectomy was completed and se feels a lump in that area, she will be having a mammogram on December 30th. She has a history of right hip bursitis and is s/p multiple epidural injections and sees pain management. Her  is disabled and cannot take care of her at home and would like for her to be discharged to [facility] after this admission. Denies chest pain, SOB, abdominal pain, N/V, diarrhea/constipation.     ER course: -120, SpO2 85% on room air -> 95% on 3L nasal cannula. Labs: WBC 24.59, neutrophils 85.7%, INR 1.23, lactate 2.8 -> 2.5, Na 134, glucose 157, albumin 2.6. COVID and RVP negative. EKG: Sinus tachycardia with  bom, normal intervals, no ST segment changes, no T wave inversions (personally reviewed).     Imaging:   - CTA chest: 1. No pulmonary thromboembolism. 2. Consolidations in the right middle lobe and left lower lobe which may represent pneumonia in the appropriate setting. Additional areas of atelectasis and/or pneumonia in the lower lungs. A repeat chest CT scan is recommended in 4-6 weeks following treatment to assess for resolution  - CT head: No acute hemorrhage mass or mass effect.    Pt was given Zosyn and Vancomycin, 2L of NS. She is being admitted to med/surg for further management. Constitutional: positive for fatigue, positive for subjective fever, positive for chills, negative for decreased appetite.  Skin: negative for rashes, negative for open wounds, negative for jaundice.   Eyes: negative for blurry vision, negative for double vision.   Ears, nose, throat: negative for ear pain, positive for nasal congestion, negative for sore throat, negative for lymph node swelling.   Cardiovascular: negative for chest pain, negative for palpitations, negative for lower extremity swelling.   Respiratory: negative for shortness of breath, negative for wheezing, positive for cough.   Gastrointestinal: negative for abdominal pain, negative for nausea, positive for vomiting, negative for diarrhea, negative for constipation, negative for blood in the stool, negative for black tarry stools, positive for abdominal distension   Genitourinary: negative for burning on urination, negative for urinary urgency or frequency, negative for blood in the urine.   Endocrine: negative for cold intolerance, negative for heat intolerance, negative for increased thirst.   Hematologic: negative for easy bruising or bleeding.   Musculoskeletal: negative for muscle/joint pain, negative for decreased range of motion.   Neurological: negative for dizziness, negative for headaches, negative for loss of consciousness, negative for motor weakness, negative for sensory deficits.   Psychiatric: negative for depression, negative for anxiety.General: Awake and alert, cooperative with exam. No acute distress.   Skin: Warm, dry, and pink.   Eyes: Pupils equal and reactive to light. Extraocular eye movements intact. Minimal conjunctival erythema, b/l green discharge. No scleral icterus.   HEENT: Atraumatic, normocephalic. Moist mucus membranes.   Breast: left breast with incision clean/dry/intact. Small lump felt under incision likely suture. No other breast masses appreciated on palpation.   Cardiology: Normal S1, S2. No murmurs, rubs, or gallops. Regular rate and rhythm.   Respiratory: Rhonchi diffusely throughout the lung fields. Normal chest expansion. No accessory muscle use.   Gastrointestinal: Positive bowel sounds. Soft, non-tender. + distension. No guarding, rigidity, or rebound tenderness. No hepatosplenomegaly.   Musculoskeletal: 5/5 motor strength in all extremities. Normal range of motion.   Extremities: No peripheral edema bilaterally. Dorsalis pedis pulses 2+ bilaterally.   Neurological: A+Ox3 (person, place, and time). Cranial nerves 2-12 intact. Normal speech. No facial droop. No focal neurological deficits.   Psychiatric: Normal affect. Normal mood. 71 y/o F with PMH left breast cancer s/p lumpectomy  (Stage 0, Grade 2 cancer of the milk duct) not on chemotherapy or radiation but taking tamoxifen for immunotherapy, hysterectomy, colon polyps, PNA, diverticular disease, hemorrhoids, anxiety, uterine fibroids s/p myomectomy, right hip bursitis presented with low oxygen at home. Pt had a cold for 8 days last week and today she was feeling more weak. She had an episode of vomiting yesterday. Her  took her SpO2 which ranged from 55-78% at home and her HR ranged from . Her fingernails and lips were purple. Today she had increasing weakness, chills, sweats, subjective fevers, nausea, headache, abdominal distension. She also has pain in the left breast where her lumpectomy was completed and se feels a lump in that area, she will be having a mammogram on . She has a history of right hip bursitis and is s/p multiple epidural injections and sees pain management. Her  is disabled and cannot take care of her at home and would like for her to be discharged to [facility] after this admission. Denies chest pain, SOB, abdominal pain, N/V, diarrhea/constipation.     ER course: -120, SpO2 85% on room air -> 95% on 3L nasal cannula. Labs: WBC 24.59, neutrophils 85.7%, INR 1.23, lactate 2.8 -> 2.5, Na 134, glucose 157, albumin 2.6. COVID and RVP negative. EKG: Sinus tachycardia with  bom, normal intervals, no ST segment changes, no T wave inversions (personally reviewed).     Imaging:   - CTA chest: 1. No pulmonary thromboembolism. 2. Consolidations in the right middle lobe and left lower lobe which may represent pneumonia in the appropriate setting. Additional areas of atelectasis and/or pneumonia in the lower lungs. A repeat chest CT scan is recommended in 4-6 weeks following treatment to assess for resolution  - CT head: No acute hemorrhage mass or mass effect.    Pt was given Zosyn and Vancomycin, 2L of NS. She is being admitted to med/surg for further management.      patient ambulated with PT, currently on 3L, not on home O2 , has cough , but feels better as compared to yesterday overall     ROS  Constitutional: positive for fatigue, positive for subjective fever, positive for chills, negative for decreased appetite.  Skin: negative for rashes, negative for open wounds, negative for jaundice.   Eyes: negative for blurry vision, negative for double vision.   Ears, nose, throat: negative for ear pain, positive for nasal congestion, negative for sore throat, negative for lymph node swelling.   Cardiovascular: negative for chest pain, negative for palpitations, negative for lower extremity swelling.   Respiratory: negative for shortness of breath, negative for wheezing, positive for cough.   Gastrointestinal: negative for abdominal pain, negative for nausea, positive for vomiting, negative for diarrhea, negative for constipation, negative for blood in the stool, negative for black tarry stools, positive for abdominal distension   Genitourinary: negative for burning on urination, negative for urinary urgency or frequency, negative for blood in the urine.   Endocrine: negative for cold intolerance, negative for heat intolerance, negative for increased thirst.   Hematologic: negative for easy bruising or bleeding.   Musculoskeletal: negative for muscle/joint pain, negative for decreased range of motion.   Neurological: negative for dizziness, negative for headaches, negative for loss of consciousness, negative for motor weakness, negative for sensory deficits.   Psychiatric: negative for depression, negative for anxiety.          PHYSICAL EXAM:    Daily     Daily     ICU Vital Signs Last 24 Hrs  T(C): 37.1 (2022 07:55), Max: 37.7 (2022 21:58)  T(F): 98.8 (2022 07:55), Max: 99.9 (2022 21:58)  HR: 79 (2022 07:55) (79 - 98)  BP: 100/68 (2022 07:55) (100/68 - 112/56)  BP(mean): --  ABP: --  ABP(mean): --  RR: 20 (2022 07:55) (20 - 22)  SpO2: 94% (2022 07:55) (94% - 95%)    O2 Parameters below as of 2022 07:55  Patient On (Oxygen Delivery Method): nasal cannula  O2 Flow (L/min): 3      Physical exam   General: Awake and alert, cooperative with exam. No acute distress.   Skin: Warm, dry, and pink.   Eyes: Pupils equal and reactive to light. Extraocular eye movements intact. Minimal conjunctival erythema, b/l green discharge. No scleral icterus.   HEENT: Atraumatic, normocephalic. Moist mucus membranes.   Breast: left breast with incision clean/dry/intact. Small lump felt under incision likely suture. No other breast masses appreciated on palpation.   Cardiology: Normal S1, S2. No murmurs, rubs, or gallops. Regular rate and rhythm.   Respiratory: Rhonchi diffusely throughout the lung fields. Normal chest expansion. No accessory muscle use.   Gastrointestinal: Positive bowel sounds. Soft, non-tender. + distension. No guarding, rigidity, or rebound tenderness. No hepatosplenomegaly.   Musculoskeletal: 5/5 motor strength in all extremities. Normal range of motion.   Extremities: No peripheral edema bilaterally. Dorsalis pedis pulses 2+ bilaterally.   Neurological: A+Ox3 (person, place, and time). Cranial nerves 2-12 intact. Normal speech. No facial droop. No focal neurological deficits.   Psychiatric: Normal affect. Normal mood.                              10.1   13.20 )-----------( 260      ( 2022 07:31 )             31.0       CBC Full  -  ( 2022 07:31 )  WBC Count : 13.20 K/uL  RBC Count : 3.23 M/uL  Hemoglobin : 10.1 g/dL  Hematocrit : 31.0 %  Platelet Count - Automated : 260 K/uL  Mean Cell Volume : 96.0 fl  Mean Cell Hemoglobin : 31.3 pg  Mean Cell Hemoglobin Concentration : 32.6 gm/dL  Auto Neutrophil # : 9.84 K/uL  Auto Lymphocyte # : 1.92 K/uL  Auto Monocyte # : 1.28 K/uL  Auto Eosinophil # : 0.07 K/uL  Auto Basophil # : 0.04 K/uL  Auto Neutrophil % : 74.6 %  Auto Lymphocyte % : 14.5 %  Auto Monocyte % : 9.7 %  Auto Eosinophil % : 0.5 %  Auto Basophil % : 0.3 %          135  |  104  |  6<L>  ----------------------------<  120<H>  3.7   |  27  |  0.40<L>    Ca    8.0<L>      2022 07:31    TPro  6.2  /  Alb  2.6<L>  /  TBili  0.2  /  DBili  x   /  AST  10<L>  /  ALT  18  /  AlkPhos  37<L>  11-28      LIVER FUNCTIONS - ( 2022 14:44 )  Alb: 2.6 g/dL / Pro: 6.2 gm/dL / ALK PHOS: 37 U/L / ALT: 18 U/L / AST: 10 U/L / GGT: x             PT/INR - ( 2022 14:44 )   PT: 14.3 sec;   INR: 1.23 ratio         PTT - ( 2022 14:44 )  PTT:30.0 sec          Urinalysis Basic - ( 2022 20:27 )    Color: Yellow / Appearance: Slightly Turbid / S.005 / pH: x  Gluc: x / Ketone: Negative  / Bili: Negative / Urobili: Negative   Blood: x / Protein: 15 / Nitrite: Negative   Leuk Esterase: Small / RBC: 0-2 /HPF / WBC 3-5   Sq Epi: x / Non Sq Epi: Occasional / Bacteria: Occasional            MEDICATIONS  (STANDING):  azithromycin  IVPB 500 milliGRAM(s) IV Intermittent every 24 hours  cefTRIAXone Injectable. 1000 milliGRAM(s) IV Push every 24 hours  celecoxib 200 milliGRAM(s) Oral daily  enoxaparin Injectable 40 milliGRAM(s) SubCutaneous every 24 hours  ergocalciferol 80062 Unit(s) Oral <User Schedule>  lactobacillus acidophilus 1 Tablet(s) Oral daily  ofloxacin 0.3% Solution 1 Drop(s) Both EYES four times a day  oxyCODONE    IR 15 milliGRAM(s) Oral <User Schedule>  polyethylene glycol 3350 17 Gram(s) Oral daily  senna 2 Tablet(s) Oral at bedtime  sodium chloride 0.9%. 1000 milliLiter(s) (100 mL/Hr) IV Continuous <Continuous>  tamoxifen 20 milliGRAM(s) Oral at bedtime

## 2022-11-29 NOTE — PROGRESS NOTE ADULT - ASSESSMENT
3 y/o F presented with low pulse ox     1. Sepsis and acute hypoxic respiratory distress secondary to PNA (r/o bacteremia, UTI)   - Admit to med/surg w/ continuous pulse ox oxygen  - -120, WBC 24.59, lactate 2.8 -> 2.5   - SpO2 85% on room air -> 95% on 3L nasal cannula  - c/w supplemental O2 to maintain SpO2 > 92%   - s/p Zosyn and Vancomycin in ER; c/w Ceftriaxone and Azithromycin   - f/u UCx, BCx x 2, sputum culture, reflex lactic acid; adjust abx based on sensitivities  - CTA: consolidations right middle lobe and left lower lobe suggesting PNA   - Repeat CT chest in 4-6 weeks to assess for resolution   - Trend WBC, monitor for temperatures   - Tylenol for temperatures PRN   - s/p 2L of NS, c/w 100 ml/hr (monitor for fluid overload)   - Monitor BP closely     2. Hyperglycemia   - Glucose 157, monitor closely   - Ordered HbA1c     3. Abdominal distension likely secondary to constipation in the setting of opiate use   - Pt on oxycodone 5 times a day at home, not on a bowel regimen   - Ordered miralax, senna, suppository PRN   - If worsening abdominal distension, worsening pain, or increasing lactic acid will order CT abd/pelvis for further evaluation     4. Left sided breast pain at prior lumpectomy site   - f/u with Oncology and Breast surgeon outpt   - Pt has mammogram scheduled 12/30/22     5. Bacterial conjunctivitis   - Start Ofloxacin     6. Hypoalbuminemia   - Albumin 2.6   - Nutrition consult     7. History of breast cancer s/p resection, hysterectomy, colon polyps, PNA, diverticular disease, hemorrhoids, anxiety, uterine fibroids  - c/w home medications; verified with pt at the bedside   - Case management/social work consults/PT evaluation     DVT ppx: Lovenox 40 mg subcutaneous daily  Code status: Full code (pt agrees to chest compressions and intubation if required).   Emergency contact/HCP: Alexx Church [phone]       71 y/o F presented with low pulse ox     1. Sepsis present on admission  and acute hypoxic respiratory distress secondary to PNA suspected atypical vs gram negative rods  (r/o bacteremia, UTI)   - Admit to med/surg w/ continuous pulse ox oxygen  - -120, WBC 24.59, lactate 2.8 -> 2.5   - SpO2 85% on room air -> 95% on 3L nasal cannula  - c/w supplemental O2 to maintain SpO2 > 92%   - s/p Zosyn and Vancomycin in ER; c/w Ceftriaxone and Azithromycin   - f/u UCx, BCx x 2, sputum culture, reflex lactic acid improved   - CTA: consolidations right middle lobe and left lower lobe suggesting PNA , No PE  - Repeat CT chest in 4-6 weeks to assess for resolution   - Trend WBC, monitor for temperatures   - Tylenol for temperatures PRN   - s/p 2L of NS, c/w 100 ml/hr (monitor for fluid overload)   - Monitor BP closely     2. Hyperglycemia   - Glucose 157, monitor closely   - Ordered HbA1c     3. Abdominal distension likely secondary to constipation in the setting of opiate use - no abdominal pain , tolerating po  - Pt on oxycodone 5 times a day at home, not on a bowel regimen    miralax, senna, suppository PRN       4. Left sided breast pain at prior lumpectomy site   - f/u with Oncology and Breast surgeon outpt   - Pt has mammogram scheduled 12/30/22     5. Bacterial conjunctivitis   - Start Ofloxacin     6. Hypoalbuminemia   - Albumin 2.6   - Nutrition consult     7. History of breast cancer s/p resection, hysterectomy, colon polyps, PNA, diverticular disease, hemorrhoids, anxiety, uterine fibroids  - c/w home medications; verified with pt at the bedside   - Case management/social work consults/PT evaluation     DVT ppx: Lovenox 40 mg subcutaneous daily  Code status: Full code (pt agrees to chest compressions and intubation if required).   Emergency contact/HCP: Alexx Church [phone]   wants patient to go to rehab  and patient agrees

## 2022-11-30 LAB
ANION GAP SERPL CALC-SCNC: 5 MMOL/L — SIGNIFICANT CHANGE UP (ref 5–17)
BASOPHILS # BLD AUTO: 0.03 K/UL — SIGNIFICANT CHANGE UP (ref 0–0.2)
BASOPHILS NFR BLD AUTO: 0.4 % — SIGNIFICANT CHANGE UP (ref 0–2)
BILIRUB SERPL-MCNC: 0.2 MG/DL — SIGNIFICANT CHANGE UP (ref 0.2–1.2)
BUN SERPL-MCNC: 6 MG/DL — LOW (ref 7–23)
CALCIUM SERPL-MCNC: 8.5 MG/DL — SIGNIFICANT CHANGE UP (ref 8.5–10.1)
CHLORIDE SERPL-SCNC: 106 MMOL/L — SIGNIFICANT CHANGE UP (ref 96–108)
CO2 SERPL-SCNC: 28 MMOL/L — SIGNIFICANT CHANGE UP (ref 22–31)
CREAT SERPL-MCNC: 0.41 MG/DL — LOW (ref 0.5–1.3)
CULTURE RESULTS: SIGNIFICANT CHANGE UP
EGFR: 106 ML/MIN/1.73M2 — SIGNIFICANT CHANGE UP
EOSINOPHIL # BLD AUTO: 0.17 K/UL — SIGNIFICANT CHANGE UP (ref 0–0.5)
EOSINOPHIL NFR BLD AUTO: 2.4 % — SIGNIFICANT CHANGE UP (ref 0–6)
GLUCOSE SERPL-MCNC: 108 MG/DL — HIGH (ref 70–99)
HCT VFR BLD CALC: 32.9 % — LOW (ref 34.5–45)
HGB BLD-MCNC: 10.8 G/DL — LOW (ref 11.5–15.5)
IMM GRANULOCYTES NFR BLD AUTO: 0.3 % — SIGNIFICANT CHANGE UP (ref 0–0.9)
INR BLD: 1.29 RATIO — HIGH (ref 0.88–1.16)
LYMPHOCYTES # BLD AUTO: 1.51 K/UL — SIGNIFICANT CHANGE UP (ref 1–3.3)
LYMPHOCYTES # BLD AUTO: 21.5 % — SIGNIFICANT CHANGE UP (ref 13–44)
MCHC RBC-ENTMCNC: 30.9 PG — SIGNIFICANT CHANGE UP (ref 27–34)
MCHC RBC-ENTMCNC: 32.8 GM/DL — SIGNIFICANT CHANGE UP (ref 32–36)
MCV RBC AUTO: 94.3 FL — SIGNIFICANT CHANGE UP (ref 80–100)
MELD SCORE WITH DIALYSIS: 23 POINTS — SIGNIFICANT CHANGE UP
MELD SCORE WITHOUT DIALYSIS: 9 POINTS — SIGNIFICANT CHANGE UP
MONOCYTES # BLD AUTO: 0.71 K/UL — SIGNIFICANT CHANGE UP (ref 0–0.9)
MONOCYTES NFR BLD AUTO: 10.1 % — SIGNIFICANT CHANGE UP (ref 2–14)
NEUTROPHILS # BLD AUTO: 4.57 K/UL — SIGNIFICANT CHANGE UP (ref 1.8–7.4)
NEUTROPHILS NFR BLD AUTO: 65.3 % — SIGNIFICANT CHANGE UP (ref 43–77)
PLATELET # BLD AUTO: 277 K/UL — SIGNIFICANT CHANGE UP (ref 150–400)
POTASSIUM SERPL-MCNC: 3.7 MMOL/L — SIGNIFICANT CHANGE UP (ref 3.5–5.3)
POTASSIUM SERPL-SCNC: 3.7 MMOL/L — SIGNIFICANT CHANGE UP (ref 3.5–5.3)
PROTHROM AB SERPL-ACNC: 15 SEC — HIGH (ref 10.5–13.4)
RBC # BLD: 3.49 M/UL — LOW (ref 3.8–5.2)
RBC # FLD: 12.9 % — SIGNIFICANT CHANGE UP (ref 10.3–14.5)
SODIUM SERPL-SCNC: 139 MMOL/L — SIGNIFICANT CHANGE UP (ref 135–145)
SPECIMEN SOURCE: SIGNIFICANT CHANGE UP
WBC # BLD: 7.01 K/UL — SIGNIFICANT CHANGE UP (ref 3.8–10.5)
WBC # FLD AUTO: 7.01 K/UL — SIGNIFICANT CHANGE UP (ref 3.8–10.5)

## 2022-11-30 PROCEDURE — 99232 SBSQ HOSP IP/OBS MODERATE 35: CPT

## 2022-11-30 RX ORDER — AZITHROMYCIN 500 MG/1
500 TABLET, FILM COATED ORAL AT BEDTIME
Refills: 0 | Status: DISCONTINUED | OUTPATIENT
Start: 2022-12-01 | End: 2022-12-01

## 2022-11-30 RX ADMIN — OXYCODONE HYDROCHLORIDE 15 MILLIGRAM(S): 5 TABLET ORAL at 10:34

## 2022-11-30 RX ADMIN — ENOXAPARIN SODIUM 40 MILLIGRAM(S): 100 INJECTION SUBCUTANEOUS at 23:59

## 2022-11-30 RX ADMIN — OXYCODONE HYDROCHLORIDE 15 MILLIGRAM(S): 5 TABLET ORAL at 15:01

## 2022-11-30 RX ADMIN — OXYCODONE HYDROCHLORIDE 15 MILLIGRAM(S): 5 TABLET ORAL at 23:20

## 2022-11-30 RX ADMIN — OXYCODONE HYDROCHLORIDE 15 MILLIGRAM(S): 5 TABLET ORAL at 05:47

## 2022-11-30 RX ADMIN — Medication 1 DROP(S): at 05:47

## 2022-11-30 RX ADMIN — CEFTRIAXONE 1000 MILLIGRAM(S): 500 INJECTION, POWDER, FOR SOLUTION INTRAMUSCULAR; INTRAVENOUS at 23:58

## 2022-11-30 RX ADMIN — CELECOXIB 200 MILLIGRAM(S): 200 CAPSULE ORAL at 10:34

## 2022-11-30 RX ADMIN — OXYCODONE HYDROCHLORIDE 15 MILLIGRAM(S): 5 TABLET ORAL at 22:15

## 2022-11-30 RX ADMIN — Medication 1 DROP(S): at 23:59

## 2022-11-30 RX ADMIN — OXYCODONE HYDROCHLORIDE 15 MILLIGRAM(S): 5 TABLET ORAL at 10:30

## 2022-11-30 RX ADMIN — OXYCODONE HYDROCHLORIDE 15 MILLIGRAM(S): 5 TABLET ORAL at 16:00

## 2022-11-30 RX ADMIN — AZITHROMYCIN 255 MILLIGRAM(S): 500 TABLET, FILM COATED ORAL at 00:38

## 2022-11-30 RX ADMIN — Medication 1 DROP(S): at 17:37

## 2022-11-30 RX ADMIN — TAMOXIFEN CITRATE 20 MILLIGRAM(S): 20 TABLET, FILM COATED ORAL at 00:38

## 2022-11-30 RX ADMIN — TAMOXIFEN CITRATE 20 MILLIGRAM(S): 20 TABLET, FILM COATED ORAL at 22:16

## 2022-11-30 RX ADMIN — POLYETHYLENE GLYCOL 3350 17 GRAM(S): 17 POWDER, FOR SOLUTION ORAL at 10:34

## 2022-11-30 RX ADMIN — Medication 1 TABLET(S): at 10:35

## 2022-11-30 RX ADMIN — Medication 1 DROP(S): at 00:38

## 2022-11-30 RX ADMIN — AZITHROMYCIN 255 MILLIGRAM(S): 500 TABLET, FILM COATED ORAL at 23:58

## 2022-11-30 RX ADMIN — Medication 1 DROP(S): at 12:09

## 2022-11-30 RX ADMIN — Medication 10 MILLIGRAM(S): at 22:15

## 2022-11-30 NOTE — DIETITIAN INITIAL EVALUATION ADULT - PERTINENT LABORATORY DATA
11-30    139  |  106  |  6<L>  ----------------------------<  108<H>  3.7   |  28  |  0.41<L>    Ca    8.5      30 Nov 2022 06:25    TPro  x   /  Alb  x   /  TBili  0.2  /  DBili  x   /  AST  x   /  ALT  x   /  AlkPhos  x   11-30  A1C with Estimated Average Glucose Result: 5.8 % (11-29-22 @ 07:31)

## 2022-11-30 NOTE — DIETITIAN INITIAL EVALUATION ADULT - PERTINENT MEDS FT
MEDICATIONS  (STANDING):  azithromycin  IVPB 500 milliGRAM(s) IV Intermittent every 24 hours  cefTRIAXone Injectable. 1000 milliGRAM(s) IV Push every 24 hours  celecoxib 200 milliGRAM(s) Oral daily  enoxaparin Injectable 40 milliGRAM(s) SubCutaneous every 24 hours  ergocalciferol 59620 Unit(s) Oral <User Schedule>  lactobacillus acidophilus 1 Tablet(s) Oral daily  ofloxacin 0.3% Solution 1 Drop(s) Both EYES four times a day  oxyCODONE    IR 15 milliGRAM(s) Oral <User Schedule>  polyethylene glycol 3350 17 Gram(s) Oral daily  senna 2 Tablet(s) Oral at bedtime  sodium chloride 0.9%. 1000 milliLiter(s) (100 mL/Hr) IV Continuous <Continuous>  tamoxifen 20 milliGRAM(s) Oral at bedtime    MEDICATIONS  (PRN):  acetaminophen     Tablet .. 650 milliGRAM(s) Oral every 6 hours PRN Temp greater or equal to 38C (100.4F), Mild Pain (1 - 3)  aluminum hydroxide/magnesium hydroxide/simethicone Suspension 30 milliLiter(s) Oral every 4 hours PRN Dyspepsia  bisacodyl Suppository 10 milliGRAM(s) Rectal daily PRN Constipation  diazepam    Tablet 10 milliGRAM(s) Oral every 8 hours PRN Muscle Spasms or Anxiety  gabapentin 800 milliGRAM(s) Oral daily PRN neuropathy  melatonin 3 milliGRAM(s) Oral at bedtime PRN Insomnia  ondansetron Injectable 4 milliGRAM(s) IV Push every 8 hours PRN Nausea and/or Vomiting

## 2022-11-30 NOTE — DIETITIAN NUTRITION RISK NOTIFICATION - TREATMENT: THE FOLLOWING DIET HAS BEEN RECOMMENDED
Diet, DASH/TLC:   Sodium & Cholesterol Restricted (11-28-22 @ 21:13) [Active]  Diet, Regular (11-28-22 @ 19:39) [Available for Activation]

## 2022-11-30 NOTE — DIETITIAN INITIAL EVALUATION ADULT - OTHER INFO
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 Adm for PNA.     Pt ate cereal breakfast, sandwich lunch; <50% ENN per RN flowsheet. RD obtained bedscale wt today 11/30/22: 140#, UBW: 130# last month per pt. IBW: 115#. NFPE reveals mild/moderate muscle/fat wasting. Pt on DASH diet.  Will add suppls, pt willing. See below recommendations.

## 2022-11-30 NOTE — PROGRESS NOTE ADULT - ASSESSMENT
73 y/o F presented with low pulse ox     1. Sepsis present on admission  and acute hypoxic respiratory distress secondary to PNA suspected atypical vs gram negative rods  (r/o bacteremia, UTI)   - continuous pulse ox oxygen  - hypoxia improving  - c/w supplemental O2 to maintain SpO2 > 92%   - s/p Zosyn and Vancomycin in ER; c/w Ceftriaxone and Azithromycin plan to change to po tomorrow   - prelim cx negative.  sputum culture  - CTA: consolidations right middle lobe and left lower lobe suggesting PNA , No PE  - Repeat CT chest in 4-6 weeks to assess for resolution   - Trend WBC, monitor for temperatures   - Tylenol for temperatures PRN   - s/p 2L of NS, c/w 100 ml/hr (monitor for fluid overload)   - Monitor BP closely     2. Hyperglycemia   - Glucose 157, monitor closely   - Ordered HbA1c     3. Abdominal distension likely secondary to constipation in the setting of opiate use - no abdominal pain , tolerating po  - Pt on oxycodone 5 times a day at home, not on a bowel regimen    miralax, senna, suppository PRN       4. Left sided breast pain at prior lumpectomy site   - f/u with Oncology and Breast surgeon outpt   - Pt has mammogram scheduled 12/30/22     5. Bacterial conjunctivitis   - Start Ofloxacin     6. Hypoalbuminemia   - Albumin 2.6   - Nutrition consult     7. History of breast cancer s/p resection, hysterectomy, colon polyps, PNA, diverticular disease, hemorrhoids, anxiety, uterine fibroids  - c/w home medications; verified with pt at the bedside   - Case management/social work consults/PT evaluation     DVT ppx: Lovenox 40 mg subcutaneous daily  Code status: Full code (pt agrees to chest compressions and intubation if required).   Emergency contact/HCP: Alexx Church [phone]   wants patient to go to rehab  and patient agrees   DENISE tomorrow

## 2022-11-30 NOTE — DIETITIAN INITIAL EVALUATION ADULT - PHYSICAL ASSESSMENT TRICEPS
Physical Therapy treatment note     Patient Name: Paco Schuler    LXULU'Y Date: 6/23/2022     Problem List  Principal Problem:    Sacral wound  Active Problems:    Severe protein-calorie malnutrition ([address] )    Dysphagia, oropharyngeal phase    Scleroderma ([facility] [address] )    Acquired hypothyroidism    Anemia    Urinary retention    Acute respiratory failure with hypoxia (HCC)    Pneumonia    Hearing loss       Past Medical History  Past Medical History:   Diagnosis Date    Dysphagia, oropharyngeal phase 06/06/2022        Past Surgical History  Past Surgical History:   Procedure Laterality Date    WOUND DEBRIDEMENT N/A 6/14/2022    Procedure: DEBRIDEMENT WOUND [facility] OUT); SACRUM AND BUTTOCKS;  Surgeon: Sarkis Shaw MD;  Location: [facility];  Service: General      06/23/22 1415   PT Last Visit   PT Visit Date 06/23/22   Note Type   Note Type Treatment   Pain Assessment   Pain Assessment Tool FLACC   Pain Rating: FLACC (Rest) - Face 0   Pain Rating: FLACC (Rest) - Legs 0   Pain Rating: FLACC (Rest) - Activity 0   Pain Rating: FLACC (Rest) - Cry 0   Pain Rating: FLACC (Rest) - Consolability 0   Score: FLACC (Rest) 0   Pain Rating: FLACC (Activity) - Face 0   Pain Rating: FLACC (Activity) - Legs 0   Pain Rating: FLACC (Activity) - Activity 0   Pain Rating: FLACC (Activity) - Cry 0   Pain Rating: FLACC (Activity) - Consolability 0   Score: FLACC (Activity) 0   Restrictions/Precautions   Weight Bearing Precautions Per Order No   Other Precautions Fall Risk;Multiple lines;Telemetry  (wound vac)   General   Chart Reviewed Yes   Family/Caregiver Present Yes   Cognition   Overall Cognitive Status Impaired   Arousal/Participation Alert; Cooperative   Attention Attends with cues to redirect   Bed Mobility   Supine to Sit 2  Maximal assistance   Additional items Assist x 2   Sit to Supine 2  Maximal assistance   Additional items Assist x 2   Additional Comments sitting EOB at a min-mod A with activity   Balance   Static Sitting Poor +   Dynamic Sitting Poor   Endurance Deficit   Endurance Deficit Yes   Activity Tolerance   Activity Tolerance Patient limited by fatigue   Medical Staff Made Aware OT   Nurse Made Aware nurse approved therapy session   Exercises   Hip Flexion Sitting;AAROM; Bilateral  (10 reps x 2 sets)   Knee AROM Long Arc Quad Sitting;AAROM; Bilateral  (10resp x 2 sets)   Balance training  sitting EOB at a min-mod A level 10-12 minutes   Assessment   Prognosis Fair   Problem List Decreased strength;Decreased endurance; Impaired balance;Decreased mobility; Decreased cognition;Decreased safety awareness; Impaired hearing   Assessment Pt presents to therapy today with reduced mobility, poor endurance, high risk of falling, poor activity tolerance, [place], confusion, poor sitting tolerance, reduced ROM of BLE And B UE, high risk of falling    These impairments limit the patient by requiring increased assistance for mobility and places her at rsik of falling  Pt would benefit from continued skilled therapy while in the hospital to improve overall mobility and work towards a safe d/c  Recommend return to facility  At end of session patient was left supine with call bell within reach  Pt's family member present  Pt and her family given HEP AP, quad sets, glute sets and UE AROM  The patient's AM-PAC Basic Mobility Inpatient Short Form Raw Score is 7  A Raw score of less than or equal to 16 suggests the patient may benefit from discharge to post-acute rehabilitation services  Please also refer to the recommendation of the Physical Therapist for safe discharge planning  Goals   LTG Expiration Date 07/05/22   PT Treatment Day 1   Plan   Treatment/Interventions LE strengthening/ROM; Endurance training; Therapeutic exercise; Bed mobility; Equipment eval/education;OT   Progress Progressing toward goals   PT Frequency 2-3x/wk   Recommendation   PT Discharge Recommendation Return to facility with rehabilitation services   AM-PAC Basic Mobility Inpatient   Turning in Bed Without Bedrails 2   Lying on Back to Sitting on Edge of Flat Bed 1   Moving Bed to Chair 1   Standing Up From Chair 1   Walk in Room 1   Climb 3-5 Stairs 1   Basic Mobility Inpatient Raw Score 7   Turning Head Towards Sound 3   Follow Simple Instructions 3   Low Function Basic Mobility Raw Score 13   Low Function Basic Mobility Standardized Score 20 14   Highest Level Of Mobility   JH-HLM Goal 2: Bed activities/Dependent transfer   JH-HLM Achieved 3: Sit at edge of bed   Juan Willams PT, DPT moderate

## 2022-11-30 NOTE — DIETITIAN INITIAL EVALUATION ADULT - ADD RECOMMEND
1. Add Ensure Plus Hi Pro BID to help pt optimize ENN po and pro intake.   2. Consider adding thiamine 100 mg daily 2/2 poor PO intake/ malnutrition and MVI w/ minerals daily to ensure 100% RDA met   3. Add Vit C 500 mg BID, add Zinc Sulfate 220 mg x 10 days to promote wound healing.   4. Monitor bowel movements, if no BM for >3 days, consider implementing bowel regimen.   5. RDN will continue to monitor PO intake, labs, hydration, and wt prn.

## 2022-11-30 NOTE — PROGRESS NOTE ADULT - SUBJECTIVE AND OBJECTIVE BOX
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 Pt had a cold for 8 days last week and today she was feeling more weak. She had an episode of vomiting yesterday. Her  took her SpO2 which ranged from 55-78% at home and her HR ranged from . Her fingernails and lips were purple. Today she had increasing weakness, chills, sweats, subjective fevers, nausea, headache, abdominal distension. She also has pain in the left breast where her lumpectomy was completed and se feels a lump in that area, she will be having a mammogram on . She has a history of right hip bursitis and is s/p multiple epidural injections and sees pain management. Her  is disabled and cannot take care of her at home and would like for her to be discharged to [facility] after this admission. Denies chest pain, SOB, abdominal pain, N/V, diarrhea/constipation.     ER course: -120, SpO2 85% on room air -> 95% on 3L nasal cannula. Labs: WBC 24.59, neutrophils 85.7%, INR 1.23, lactate 2.8 -> 2.5, Na 134, glucose 157, albumin 2.6. COVID and RVP negative. EKG: Sinus tachycardia with  bom, normal intervals, no ST segment changes, no T wave inversions (personally reviewed).     Imaging:   - CTA chest: 1. No pulmonary thromboembolism. 2. Consolidations in the right middle lobe and left lower lobe which may represent pneumonia in the appropriate setting. Additional areas of atelectasis and/or pneumonia in the lower lungs. A repeat chest CT scan is recommended in 4-6 weeks following treatment to assess for resolution  - CT head: No acute hemorrhage mass or mass effect.    Pt was given Zosyn and Vancomycin, 2L of NS. She is being admitted to med/surg for further management.      patient ambulated with PT, currently on 3L, not on home O2 , has cough , but feels better as compared to yesterday overall     Vital Signs Last 24 Hrs  T(C): 36.4 (:38), Max: 36.6 (2022 22:04)  T(F): 97.5 (:38), Max: 97.9 (2022 22:04)  HR: 87 (:38) (78 - 87)  BP: 114/76 (:38) (113/60 - 114/76)  BP(mean): --  RR: 18 (:38) (18 - 18)  SpO2: 97% (:38) (95% - 97%)    Parameters below as of   Patient On (Oxygen Delivery Method): nasal cannula  O2 Flow (L/min): 3      GEN: lying in bed, NAD  HEENT:   NC/AT, pupils equal and reactive, EOMI  CV:  +S1, +S2, RRR  RESP:   scattered rhonchi   BREAST:  not examined  GI:  abdomen soft, non-tender, non-distended, normoactive BS  RECTAL:  not examined  :  not examined  MSK:   normal muscle tone  EXT:  no edema  NEURO:  AAOX3, no focal neurological deficits  SKIN:  no rashes                              10.8   7.01  )-----------( 277      ( 2022 06:25 )             32.9     11-30    139  |  106  |  6<L>  ----------------------------<  108<H>  3.7   |  28  |  0.41<L>    Ca    8.5      2022 06:25    TPro  x   /  Alb  x   /  TBili  0.2  /  DBili  x   /  AST  x   /  ALT  x   /  AlkPhos  x   11-30        LIVER FUNCTIONS - ( 2022 14:44 )  Alb: 2.6 g/dL / Pro: 6.2 gm/dL / ALK PHOS: 37 U/L / ALT: 18 U/L / AST: 10 U/L / GGT: x           PT/INR - ( 2022 06:25 )   PT: 15.0 sec;   INR: 1.29 ratio         PTT - ( 2022 14:44 )  PTT:30.0 sec  Urinalysis Basic - ( 2022 20:27 )    Color: Yellow / Appearance: Slightly Turbid / S.005 / pH: x  Gluc: x / Ketone: Negative  / Bili: Negative / Urobili: Negative   Blood: x / Protein: 15 / Nitrite: Negative   Leuk Esterase: Small / RBC: 0-2 /HPF / WBC 3-5   Sq Epi: x / Non Sq Epi: Occasional / Bacteria: Occasional

## 2022-12-01 ENCOUNTER — TRANSCRIPTION ENCOUNTER (OUTPATIENT)
Age: 72
End: 2022-12-01

## 2022-12-01 VITALS
RESPIRATION RATE: 18 BRPM | OXYGEN SATURATION: 95 % | DIASTOLIC BLOOD PRESSURE: 75 MMHG | SYSTOLIC BLOOD PRESSURE: 119 MMHG | TEMPERATURE: 98 F | HEART RATE: 87 BPM

## 2022-12-01 PROCEDURE — 99239 HOSP IP/OBS DSCHRG MGMT >30: CPT

## 2022-12-01 RX ORDER — CEFUROXIME AXETIL 250 MG
500 TABLET ORAL EVERY 12 HOURS
Refills: 0 | Status: DISCONTINUED | OUTPATIENT
Start: 2022-12-01 | End: 2022-12-01

## 2022-12-01 RX ORDER — LACTOBACILLUS ACIDOPHILUS 100MM CELL
1 CAPSULE ORAL
Qty: 0 | Refills: 0 | DISCHARGE
Start: 2022-12-01

## 2022-12-01 RX ORDER — ACETAMINOPHEN 500 MG
2 TABLET ORAL
Qty: 0 | Refills: 0 | DISCHARGE
Start: 2022-12-01

## 2022-12-01 RX ORDER — CEFUROXIME AXETIL 250 MG
1 TABLET ORAL
Qty: 0 | Refills: 0 | DISCHARGE
Start: 2022-12-01

## 2022-12-01 RX ORDER — SENNA PLUS 8.6 MG/1
2 TABLET ORAL
Qty: 0 | Refills: 0 | DISCHARGE
Start: 2022-12-01

## 2022-12-01 RX ORDER — OFLOXACIN 0.3 %
1 DROPS OPHTHALMIC (EYE)
Qty: 0 | Refills: 0 | DISCHARGE
Start: 2022-12-01

## 2022-12-01 RX ORDER — AZITHROMYCIN 500 MG/1
1 TABLET, FILM COATED ORAL
Qty: 0 | Refills: 0 | DISCHARGE
Start: 2022-12-01

## 2022-12-01 RX ADMIN — OXYCODONE HYDROCHLORIDE 15 MILLIGRAM(S): 5 TABLET ORAL at 06:15

## 2022-12-01 RX ADMIN — CELECOXIB 200 MILLIGRAM(S): 200 CAPSULE ORAL at 09:55

## 2022-12-01 RX ADMIN — Medication 1 DROP(S): at 05:14

## 2022-12-01 RX ADMIN — Medication 1 TABLET(S): at 09:54

## 2022-12-01 RX ADMIN — POLYETHYLENE GLYCOL 3350 17 GRAM(S): 17 POWDER, FOR SOLUTION ORAL at 09:56

## 2022-12-01 RX ADMIN — OXYCODONE HYDROCHLORIDE 15 MILLIGRAM(S): 5 TABLET ORAL at 05:14

## 2022-12-01 RX ADMIN — Medication 1 DROP(S): at 12:30

## 2022-12-01 RX ADMIN — Medication 10 MILLIGRAM(S): at 12:30

## 2022-12-01 RX ADMIN — OXYCODONE HYDROCHLORIDE 15 MILLIGRAM(S): 5 TABLET ORAL at 09:54

## 2022-12-01 NOTE — DISCHARGE NOTE PROVIDER - CARE PROVIDER_API CALL
MARQUITA MURPHY  [facility]  N  KATHERINE JUÁREZ DO,    Phone: [phone]  Fax: [phone]  Follow Up Time:

## 2022-12-01 NOTE — DISCHARGE NOTE PROVIDER - NSDCCPCAREPLAN_GEN_ALL_CORE_FT
PRINCIPAL DISCHARGE DIAGNOSIS  Diagnosis: Pneumonia  Assessment and Plan of Treatment: .You were found to have pneumonia which is an infection in one or both of the lungs. It causes the air sacs of the lungs to fill up with fluid or pus. It can range from mild to severe, depending on the type of germ causing the infection, your age, and your overall health. Continue to take ceftin and zithromax antibiotics. Continue to stay hydrated. **Monitor for the following signs/symptoms: Fever > 101, chills, cough with phlegm, shortness of breath and nausea and/or vomiting. If you experience these signs/symptoms please alert your primary care provider or if your signs/symptoms are severe please return to the ED**  - you need to repeat CT scan of your chest in 4-6 weeks and this can be arranged by your primary doctor who you should see after rehab      SECONDARY DISCHARGE DIAGNOSES  Diagnosis: Leukocytosis  Assessment and Plan of Treatment:

## 2022-12-01 NOTE — DISCHARGE NOTE NURSING/CASE MANAGEMENT/SOCIAL WORK - NSDCPEFALRISK_GEN_ALL_CORE
For information on Fall & Injury Prevention, visit: [email or web address] OR  [email or web address] OR  https://www.cdc.gov/steadi/patient.html

## 2022-12-01 NOTE — DISCHARGE NOTE PROVIDER - HOSPITAL COURSE
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 Pt had a cold for 8 days last week and today she was feeling more weak. She had an episode of vomiting yesterday. Her  took her SpO2 which ranged from 55-78% at home and her HR ranged from . Her fingernails and lips were purple. Today she had increasing weakness, chills, sweats, subjective fevers, nausea, headache, abdominal distension. She also has pain in the left breast where her lumpectomy was completed and se feels a lump in that area, she will be having a mammogram on December 30th. She has a history of right hip bursitis and is s/p multiple epidural injections and sees pain management. Her  is disabled and cannot take care of her at home and would like for her to be discharged to [facility] after this admission. Denies chest pain, SOB, abdominal pain, N/V, diarrhea/constipation.     ER course: -120, SpO2 85% on room air -> 95% on 3L nasal cannula. Labs: WBC 24.59, neutrophils 85.7%, INR 1.23, lactate 2.8 -> 2.5, Na 134, glucose 157, albumin 2.6. COVID and RVP negative. EKG: Sinus tachycardia with  bom, normal intervals, no ST segment changes, no T wave inversions (personally reviewed).     Imaging:   - CTA chest: 1. No pulmonary thromboembolism. 2. Consolidations in the right middle lobe and left lower lobe which may represent pneumonia in the appropriate setting. Additional areas of atelectasis and/or pneumonia in the lower lungs. A repeat chest CT scan is recommended in 4-6 weeks following treatment to assess for resolution  - CT head: No acute hemorrhage mass or mass effect.    Pt was given Zosyn and Vancomycin, 2L of NS. She is being admitted to med/surg for further management. pt continued on ceftriaxone and zithromasx which she compelted 3 day IV course and now changed to po and plan for rehab today.  93% on RA upon dc.     pt and  aware of recommendation for need for repeat CT scan in 4-6 weeks.     Vital Signs Last 24 Hrs  T(C): 36.5 (01 Dec 2022 07:20), Max: 37 (30 Nov 2022 17:15)  T(F): 97.7 (01 Dec 2022 07:20), Max: 98.6 (30 Nov 2022 17:15)  HR: 87 (01 Dec 2022 07:20) (80 - 95)  BP: 119/75 (01 Dec 2022 07:20) (101/59 - 119/75)  BP(mean): --  RR: 18 (01 Dec 2022 07:20) (18 - 18)  SpO2: 95% (01 Dec 2022 07:20) (95% - 96%)    Parameters below as of 01 Dec 2022 07:20  Patient On (Oxygen Delivery Method): nasal cannula  O2 Flow (L/min): 2      GEN: lying in bed, NAD  HEENT:   NC/AT, pupils equal and reactive, EOMI  CV:  +S1, +S2, RRR  RESP:   lungs clear to auscultation bilaterally, no wheeze, rales, rhonchi (lung exam much improved)  BREAST:  not examined  GI:  abdomen soft, non-tender, non-distended, normoactive BS  MSK:   normal muscle tone  EXT:  no edema  NEURO:  AAOX3, no focal neurological deficits  SKIN:  no rashes

## 2022-12-01 NOTE — DISCHARGE NOTE NURSING/CASE MANAGEMENT/SOCIAL WORK - PATIENT PORTAL LINK FT
You can access the FollowMyHealth Patient Portal offered by [facility] by registering at the following website: [email or web address]. By joining [employer]’s FollowMyHealth portal, you will also be able to view your health information using other applications (apps) compatible with our system.

## 2022-12-01 NOTE — DISCHARGE NOTE PROVIDER - DETAILS OF MALNUTRITION DIAGNOSIS/DIAGNOSES
This patient has been assessed with a concern for Malnutrition and was treated during this hospitalization for the following Nutrition diagnosis/diagnoses:     -  11/30/2022: Severe protein-calorie malnutrition

## 2022-12-01 NOTE — DISCHARGE NOTE PROVIDER - NSDCMRMEDTOKEN_GEN_ALL_CORE_FT
acetaminophen 325 mg oral tablet: 2 tab(s) orally every 6 hours, As needed, Temp greater or equal to 38C (100.4F), Mild Pain (1 - 3)  alendronate 70 mg oral tablet: 1 tab(s) orally once a week on Monday   azithromycin 500 mg oral tablet: 1 tab(s) orally once a day (at bedtime) X 2 days  bisacodyl 10 mg rectal suppository: 1 suppository(ies) rectal once a day, As needed, Constipation  cefuroxime 500 mg oral tablet: 1 tab(s) orally every 12 hours X 7 days  diazePAM 10 mg oral tablet: 1 tab(s) orally every 8 hours, As Needed  gabapentin 800 mg oral tablet: 1 tab(s) orally once a day, As Needed  lactobacillus acidophilus oral capsule: 1  orally 2 times a day  meloxicam 7.5 mg oral tablet: 1 tab(s) orally once a day  ofloxacin 0.3% ophthalmic solution: 1 drop(s) to each affected eye 4 times a day - stop on 12/3   oxyCODONE 15 mg oral tablet: 1 tab(s) orally 5 times a day  senna leaf extract oral tablet: 2 tab(s) orally once a day (at bedtime)  tamoxifen 20 mg oral tablet: 1 tab(s) orally once a day (at bedtime)  Vitamin D3 1250 mcg (50,000 intl units) oral capsule: 1 cap(s) orally once a week on Monday

## 2022-12-03 LAB
CULTURE RESULTS: SIGNIFICANT CHANGE UP
CULTURE RESULTS: SIGNIFICANT CHANGE UP
SPECIMEN SOURCE: SIGNIFICANT CHANGE UP
SPECIMEN SOURCE: SIGNIFICANT CHANGE UP

## 2022-12-06 DIAGNOSIS — Z85.3 PERSONAL HISTORY OF MALIGNANT NEOPLASM OF BREAST: ICD-10-CM

## 2022-12-06 DIAGNOSIS — R06.03 ACUTE RESPIRATORY DISTRESS: ICD-10-CM

## 2022-12-06 DIAGNOSIS — E43 UNSPECIFIED SEVERE PROTEIN-CALORIE MALNUTRITION: ICD-10-CM

## 2022-12-06 DIAGNOSIS — A41.9 SEPSIS, UNSPECIFIED ORGANISM: ICD-10-CM

## 2022-12-06 DIAGNOSIS — H10.89 OTHER CONJUNCTIVITIS: ICD-10-CM

## 2022-12-06 DIAGNOSIS — R73.9 HYPERGLYCEMIA, UNSPECIFIED: ICD-10-CM

## 2022-12-06 DIAGNOSIS — Z90.710 ACQUIRED ABSENCE OF BOTH CERVIX AND UTERUS: ICD-10-CM

## 2022-12-06 DIAGNOSIS — E88.09 OTHER DISORDERS OF PLASMA-PROTEIN METABOLISM, NOT ELSEWHERE CLASSIFIED: ICD-10-CM

## 2022-12-06 DIAGNOSIS — J18.9 PNEUMONIA, UNSPECIFIED ORGANISM: ICD-10-CM

## 2022-12-14 ENCOUNTER — TRANSCRIPTION ENCOUNTER (OUTPATIENT)
Age: 72
End: 2022-12-14

## 2022-12-19 ENCOUNTER — TRANSCRIPTION ENCOUNTER (OUTPATIENT)
Age: 72
End: 2022-12-19

## 2023-02-16 ENCOUNTER — INPATIENT (INPATIENT)
Facility: HOSPITAL | Age: 73
LOS: 2 days | Discharge: HOME | DRG: 872 | End: 2023-02-19
Attending: FAMILY MEDICINE | Admitting: FAMILY MEDICINE
Payer: MEDICARE

## 2023-02-16 VITALS — HEIGHT: 62 IN | WEIGHT: 139.99 LBS

## 2023-02-16 DIAGNOSIS — Z98.890 OTHER SPECIFIED POSTPROCEDURAL STATES: Chronic | ICD-10-CM

## 2023-02-16 DIAGNOSIS — A41.9 SEPSIS, UNSPECIFIED ORGANISM: ICD-10-CM

## 2023-02-16 DIAGNOSIS — Z90.710 ACQUIRED ABSENCE OF BOTH CERVIX AND UTERUS: Chronic | ICD-10-CM

## 2023-02-16 PROBLEM — J18.9 PNEUMONIA, UNSPECIFIED ORGANISM: Chronic | Status: ACTIVE | Noted: 2022-11-28

## 2023-02-16 PROBLEM — Z87.19 PERSONAL HISTORY OF OTHER DISEASES OF THE DIGESTIVE SYSTEM: Chronic | Status: ACTIVE | Noted: 2022-11-28

## 2023-02-16 PROBLEM — K63.5 POLYP OF COLON: Chronic | Status: ACTIVE | Noted: 2022-11-28

## 2023-02-16 PROBLEM — M70.71 OTHER BURSITIS OF HIP, RIGHT HIP: Chronic | Status: ACTIVE | Noted: 2022-11-28

## 2023-02-16 PROBLEM — K57.90 DIVERTICULOSIS OF INTESTINE, PART UNSPECIFIED, WITHOUT PERFORATION OR ABSCESS WITHOUT BLEEDING: Chronic | Status: ACTIVE | Noted: 2022-11-28

## 2023-02-16 PROBLEM — D25.9 LEIOMYOMA OF UTERUS, UNSPECIFIED: Chronic | Status: ACTIVE | Noted: 2022-11-28

## 2023-02-16 PROBLEM — C50.919 MALIGNANT NEOPLASM OF UNSPECIFIED SITE OF UNSPECIFIED FEMALE BREAST: Chronic | Status: ACTIVE | Noted: 2022-12-09

## 2023-02-16 LAB
ALBUMIN SERPL ELPH-MCNC: 3.1 G/DL — LOW (ref 3.3–5)
ALP SERPL-CCNC: 42 U/L — SIGNIFICANT CHANGE UP (ref 40–120)
ALT FLD-CCNC: 24 U/L — SIGNIFICANT CHANGE UP (ref 12–78)
ANION GAP SERPL CALC-SCNC: 4 MMOL/L — LOW (ref 5–17)
APPEARANCE UR: CLEAR — SIGNIFICANT CHANGE UP
AST SERPL-CCNC: 19 U/L — SIGNIFICANT CHANGE UP (ref 15–37)
BASOPHILS # BLD AUTO: 0.04 K/UL — SIGNIFICANT CHANGE UP (ref 0–0.2)
BASOPHILS NFR BLD AUTO: 0.2 % — SIGNIFICANT CHANGE UP (ref 0–2)
BILIRUB SERPL-MCNC: 0.4 MG/DL — SIGNIFICANT CHANGE UP (ref 0.2–1.2)
BILIRUB UR-MCNC: NEGATIVE — SIGNIFICANT CHANGE UP
BUN SERPL-MCNC: 9 MG/DL — SIGNIFICANT CHANGE UP (ref 7–23)
CALCIUM SERPL-MCNC: 8.5 MG/DL — SIGNIFICANT CHANGE UP (ref 8.5–10.1)
CHLORIDE SERPL-SCNC: 96 MMOL/L — SIGNIFICANT CHANGE UP (ref 96–108)
CO2 SERPL-SCNC: 26 MMOL/L — SIGNIFICANT CHANGE UP (ref 22–31)
COLOR SPEC: YELLOW — SIGNIFICANT CHANGE UP
CREAT SERPL-MCNC: 0.62 MG/DL — SIGNIFICANT CHANGE UP (ref 0.5–1.3)
D DIMER BLD IA.RAPID-MCNC: 248 NG/ML DDU — HIGH
DIFF PNL FLD: NEGATIVE — SIGNIFICANT CHANGE UP
EGFR: 96 ML/MIN/1.73M2 — SIGNIFICANT CHANGE UP
EOSINOPHIL # BLD AUTO: 0.05 K/UL — SIGNIFICANT CHANGE UP (ref 0–0.5)
EOSINOPHIL NFR BLD AUTO: 0.3 % — SIGNIFICANT CHANGE UP (ref 0–6)
GLUCOSE SERPL-MCNC: 107 MG/DL — HIGH (ref 70–99)
GLUCOSE UR QL: NEGATIVE — SIGNIFICANT CHANGE UP
HCT VFR BLD CALC: 35.9 % — SIGNIFICANT CHANGE UP (ref 34.5–45)
HGB BLD-MCNC: 11.8 G/DL — SIGNIFICANT CHANGE UP (ref 11.5–15.5)
IMM GRANULOCYTES NFR BLD AUTO: 0.4 % — SIGNIFICANT CHANGE UP (ref 0–0.9)
KETONES UR-MCNC: NEGATIVE — SIGNIFICANT CHANGE UP
LACTATE SERPL-SCNC: 1.3 MMOL/L — SIGNIFICANT CHANGE UP (ref 0.7–2)
LEUKOCYTE ESTERASE UR-ACNC: NEGATIVE — SIGNIFICANT CHANGE UP
LYMPHOCYTES # BLD AUTO: 12.7 % — LOW (ref 13–44)
LYMPHOCYTES # BLD AUTO: 2.05 K/UL — SIGNIFICANT CHANGE UP (ref 1–3.3)
MAGNESIUM SERPL-MCNC: 1.8 MG/DL — SIGNIFICANT CHANGE UP (ref 1.6–2.6)
MCHC RBC-ENTMCNC: 29.6 PG — SIGNIFICANT CHANGE UP (ref 27–34)
MCHC RBC-ENTMCNC: 32.9 GM/DL — SIGNIFICANT CHANGE UP (ref 32–36)
MCV RBC AUTO: 90.2 FL — SIGNIFICANT CHANGE UP (ref 80–100)
MONOCYTES # BLD AUTO: 1.67 K/UL — HIGH (ref 0–0.9)
MONOCYTES NFR BLD AUTO: 10.4 % — SIGNIFICANT CHANGE UP (ref 2–14)
NEUTROPHILS # BLD AUTO: 12.24 K/UL — HIGH (ref 1.8–7.4)
NEUTROPHILS NFR BLD AUTO: 76 % — SIGNIFICANT CHANGE UP (ref 43–77)
NITRITE UR-MCNC: NEGATIVE — SIGNIFICANT CHANGE UP
NT-PROBNP SERPL-SCNC: 202 PG/ML — HIGH (ref 0–125)
PH UR: 8 — SIGNIFICANT CHANGE UP (ref 5–8)
PLATELET # BLD AUTO: 225 K/UL — SIGNIFICANT CHANGE UP (ref 150–400)
POTASSIUM SERPL-MCNC: 3.9 MMOL/L — SIGNIFICANT CHANGE UP (ref 3.5–5.3)
POTASSIUM SERPL-SCNC: 3.9 MMOL/L — SIGNIFICANT CHANGE UP (ref 3.5–5.3)
PROT SERPL-MCNC: 6.3 GM/DL — SIGNIFICANT CHANGE UP (ref 6–8.3)
PROT UR-MCNC: NEGATIVE — SIGNIFICANT CHANGE UP
RAPID RVP RESULT: SIGNIFICANT CHANGE UP
RBC # BLD: 3.98 M/UL — SIGNIFICANT CHANGE UP (ref 3.8–5.2)
RBC # FLD: 12.3 % — SIGNIFICANT CHANGE UP (ref 10.3–14.5)
SARS-COV-2 RNA SPEC QL NAA+PROBE: SIGNIFICANT CHANGE UP
SODIUM SERPL-SCNC: 126 MMOL/L — LOW (ref 135–145)
SP GR SPEC: 1.01 — SIGNIFICANT CHANGE UP (ref 1.01–1.02)
TROPONIN I, HIGH SENSITIVITY RESULT: 9.73 NG/L — SIGNIFICANT CHANGE UP
UROBILINOGEN FLD QL: NEGATIVE — SIGNIFICANT CHANGE UP
WBC # BLD: 16.12 K/UL — HIGH (ref 3.8–10.5)
WBC # FLD AUTO: 16.12 K/UL — HIGH (ref 3.8–10.5)

## 2023-02-16 PROCEDURE — 71045 X-RAY EXAM CHEST 1 VIEW: CPT | Mod: 26

## 2023-02-16 PROCEDURE — 80048 BASIC METABOLIC PNL TOTAL CA: CPT

## 2023-02-16 PROCEDURE — 36415 COLL VENOUS BLD VENIPUNCTURE: CPT

## 2023-02-16 PROCEDURE — 99285 EMERGENCY DEPT VISIT HI MDM: CPT

## 2023-02-16 PROCEDURE — 99222 1ST HOSP IP/OBS MODERATE 55: CPT

## 2023-02-16 PROCEDURE — 93010 ELECTROCARDIOGRAM REPORT: CPT

## 2023-02-16 PROCEDURE — 85027 COMPLETE CBC AUTOMATED: CPT

## 2023-02-16 PROCEDURE — 71275 CT ANGIOGRAPHY CHEST: CPT | Mod: 26,MA

## 2023-02-16 RX ORDER — SODIUM CHLORIDE 9 MG/ML
1000 INJECTION, SOLUTION INTRAVENOUS ONCE
Refills: 0 | Status: DISCONTINUED | OUTPATIENT
Start: 2023-02-16 | End: 2023-02-16

## 2023-02-16 RX ORDER — ACETAMINOPHEN 500 MG
1000 TABLET ORAL ONCE
Refills: 0 | Status: COMPLETED | OUTPATIENT
Start: 2023-02-16 | End: 2023-02-16

## 2023-02-16 RX ORDER — CEFEPIME 1 G/1
2000 INJECTION, POWDER, FOR SOLUTION INTRAMUSCULAR; INTRAVENOUS EVERY 12 HOURS
Refills: 0 | Status: DISCONTINUED | OUTPATIENT
Start: 2023-02-16 | End: 2023-02-19

## 2023-02-16 RX ORDER — GABAPENTIN 400 MG/1
1 CAPSULE ORAL
Qty: 0 | Refills: 0 | DISCHARGE

## 2023-02-16 RX ORDER — HEPARIN SODIUM 5000 [USP'U]/ML
5000 INJECTION INTRAVENOUS; SUBCUTANEOUS EVERY 8 HOURS
Refills: 0 | Status: DISCONTINUED | OUTPATIENT
Start: 2023-02-16 | End: 2023-02-19

## 2023-02-16 RX ORDER — SODIUM CHLORIDE 9 MG/ML
1000 INJECTION INTRAMUSCULAR; INTRAVENOUS; SUBCUTANEOUS ONCE
Refills: 0 | Status: COMPLETED | OUTPATIENT
Start: 2023-02-16 | End: 2023-02-16

## 2023-02-16 RX ORDER — SODIUM CHLORIDE 9 MG/ML
1000 INJECTION INTRAMUSCULAR; INTRAVENOUS; SUBCUTANEOUS
Refills: 0 | Status: DISCONTINUED | OUTPATIENT
Start: 2023-02-16 | End: 2023-02-19

## 2023-02-16 RX ORDER — ACETAMINOPHEN 500 MG
650 TABLET ORAL EVERY 6 HOURS
Refills: 0 | Status: DISCONTINUED | OUTPATIENT
Start: 2023-02-16 | End: 2023-02-19

## 2023-02-16 RX ORDER — CEFEPIME 1 G/1
2000 INJECTION, POWDER, FOR SOLUTION INTRAMUSCULAR; INTRAVENOUS ONCE
Refills: 0 | Status: COMPLETED | OUTPATIENT
Start: 2023-02-16 | End: 2023-02-16

## 2023-02-16 RX ORDER — DIAZEPAM 5 MG
10 TABLET ORAL ONCE
Refills: 0 | Status: DISCONTINUED | OUTPATIENT
Start: 2023-02-16 | End: 2023-02-16

## 2023-02-16 RX ORDER — VANCOMYCIN HCL 1 G
1000 VIAL (EA) INTRAVENOUS ONCE
Refills: 0 | Status: COMPLETED | OUTPATIENT
Start: 2023-02-16 | End: 2023-02-16

## 2023-02-16 RX ORDER — DIAZEPAM 5 MG
10 TABLET ORAL EVERY 8 HOURS
Refills: 0 | Status: DISCONTINUED | OUTPATIENT
Start: 2023-02-16 | End: 2023-02-19

## 2023-02-16 RX ORDER — LANOLIN ALCOHOL/MO/W.PET/CERES
3 CREAM (GRAM) TOPICAL AT BEDTIME
Refills: 0 | Status: DISCONTINUED | OUTPATIENT
Start: 2023-02-16 | End: 2023-02-19

## 2023-02-16 RX ORDER — OXYCODONE HYDROCHLORIDE 5 MG/1
15 TABLET ORAL
Refills: 0 | Status: DISCONTINUED | OUTPATIENT
Start: 2023-02-16 | End: 2023-02-19

## 2023-02-16 RX ORDER — TAMOXIFEN CITRATE 20 MG/1
20 TABLET, FILM COATED ORAL AT BEDTIME
Refills: 0 | Status: DISCONTINUED | OUTPATIENT
Start: 2023-02-16 | End: 2023-02-19

## 2023-02-16 RX ORDER — ONDANSETRON 8 MG/1
4 TABLET, FILM COATED ORAL EVERY 8 HOURS
Refills: 0 | Status: DISCONTINUED | OUTPATIENT
Start: 2023-02-16 | End: 2023-02-19

## 2023-02-16 RX ADMIN — SODIUM CHLORIDE 1000 MILLILITER(S): 9 INJECTION INTRAMUSCULAR; INTRAVENOUS; SUBCUTANEOUS at 15:12

## 2023-02-16 RX ADMIN — Medication 10 MILLIGRAM(S): at 16:30

## 2023-02-16 RX ADMIN — HEPARIN SODIUM 5000 UNIT(S): 5000 INJECTION INTRAVENOUS; SUBCUTANEOUS at 22:44

## 2023-02-16 RX ADMIN — Medication 250 MILLIGRAM(S): at 16:00

## 2023-02-16 RX ADMIN — Medication 3 MILLIGRAM(S): at 22:44

## 2023-02-16 RX ADMIN — Medication 10 MILLIGRAM(S): at 22:44

## 2023-02-16 RX ADMIN — CEFEPIME 100 MILLIGRAM(S): 1 INJECTION, POWDER, FOR SOLUTION INTRAMUSCULAR; INTRAVENOUS at 15:13

## 2023-02-16 RX ADMIN — TAMOXIFEN CITRATE 20 MILLIGRAM(S): 20 TABLET, FILM COATED ORAL at 22:44

## 2023-02-16 RX ADMIN — Medication 400 MILLIGRAM(S): at 15:12

## 2023-02-16 RX ADMIN — SODIUM CHLORIDE 100 MILLILITER(S): 9 INJECTION INTRAMUSCULAR; INTRAVENOUS; SUBCUTANEOUS at 22:00

## 2023-02-16 NOTE — ED PROVIDER NOTE - PHYSICAL EXAMINATION
Constitutional: Awake, Alert, non-toxic. No acute distress. Well appearing, well nourished.   HEAD: Normocephalic, atraumatic.   EYES: PERRL, EOM intact, conjunctiva and sclera are clear bilaterally.  ENT: External ears normal. No rhinorrhea, no tracheal deviation   NECK: Supple, non-tender  CARDIOVASCULAR: regular rate and rhythm.  RESPIRATORY: Normal respiratory effort; breath sounds CTAB, no wheezes, rhonchi, or rales. Speaking in full sentences. No accessory muscle use.   ABDOMEN: Soft; non-tender, non-distended. No rebound or guarding.   EXTREMITIES:  no lower extremity edema, no deformities  SKIN: Warm, dry  NEURO: A&O x3. Sensory and motor functions are grossly intact. Speech is normal. No facial droop.  PSYCH: Appearance and judgement seem appropriate for gender and age.

## 2023-02-16 NOTE — H&P ADULT - NSICDXPASTMEDICALHX_GEN_ALL_CORE_FT
PAST MEDICAL HISTORY:  Breast CA left breast s/p lumpectomy on immunotherapy, no chemotherapy or radiation therapy    Bursitis of right hip     Colon polyps     Diverticular disease     H/O hemorrhoids     History of hemorrhoids     Pneumonia     Uterine fibroid

## 2023-02-16 NOTE — PATIENT PROFILE ADULT - FALL HARM RISK - HARM RISK INTERVENTIONS

## 2023-02-16 NOTE — ED ADULT NURSE NOTE - OBJECTIVE STATEMENT
Pt is a 72yr old female, A&OX4 and ambulatory, c/o cough, sore throat, subjective fevers, and body aches since this morning. Found to be rectally febrile and tachycardic. Resp. even and unlabored. Denies chest pain, SOB, HA, dizziness, abd. pain, N/V, and urinary symptoms. Labs sent. Placed on cardiac monitor. In NAD.

## 2023-02-16 NOTE — ED PROVIDER NOTE - PROGRESS NOTE DETAILS
Patient found to have no evidence of pneumonia on CT scan.  She is slightly hyponatremic as well as has a leukocytosis.  Urinalysis pending.  Antibiotics given.  Will admit for rule out bacteremia.  Spoke with Dr. Gandara for admission.  Family updated and is aware of nodules.  Pete Muse MD.

## 2023-02-16 NOTE — ED PROVIDER NOTE - CLINICAL SUMMARY MEDICAL DECISION MAKING FREE TEXT BOX
Patient here for fever of unclear origin.  Without cough or dysuria, less likely pneumonia or UTI.  Will need to evaluate for bacteremia.  Fever may be related to known malignancy.  No headache or neck pain/neck stiffness to suggest meningitis.  History and exam also not consistent with strep throat.  Possible viral pathology.  We will treat as sepsis given tachycardia and patient being febrile.  Likely will need to be admitted.

## 2023-02-16 NOTE — ED ADULT TRIAGE NOTE - CHIEF COMPLAINT QUOTE
Pt presents to ER c/o fever, sore throat and body aches. Onset of symptoms began this morning when she woke up

## 2023-02-16 NOTE — ED PROVIDER NOTE - OBJECTIVE STATEMENT
Patient with a past medical history of breast cancer is presenting with concern for fevers x1 day.  Have mild cough but no chest pain or shortness of breath.  Endorsing fatigue.  No dysuria or polyuria.  No medications taken prior to arrival.  Denies nausea, vomiting, abdominal pain.

## 2023-02-16 NOTE — H&P ADULT - HISTORY OF PRESENT ILLNESS
Patient with a past medical history of breast cancer is presenting with concern for fevers x1 day.  Have mild cough but no chest pain or shortness of breath.  Endorsing fatigue.  No dysuria or polyuria.  No medications taken prior to arrival.  Denies nausea, vomiting, abdominal pain. 73 yo Female with a past medical history of Breast cancer presented with fever x1 day.  Have mild cough but no chest pain or shortness of breath.  She is also endorsing fatigue.  No dysuria or polyuria.  No medications taken prior to arrival.  Denies nausea, vomiting, abdominal pain. No recent sick contact. No recent travel.

## 2023-02-16 NOTE — PHARMACOTHERAPY INTERVENTION NOTE - COMMENTS
Medication history complete, reviewed medication with patient and  at bedside and confirmed with DrFirst.

## 2023-02-16 NOTE — H&P ADULT - NSHPPHYSICALEXAM_GEN_ALL_CORE
T(C): 36.9 (02-16-23 @ 20:30), Max: 38.3 (02-16-23 @ 14:06)  HR: 84 (02-16-23 @ 20:30) (74 - 110)  BP: 98/62 (02-16-23 @ 20:30) (98/62 - 124/78)  RR: 18 (02-16-23 @ 20:30) (16 - 19)  SpO2: 94% (02-16-23 @ 20:30) (94% - 100%)    CONSTITUTIONAL: Well groomed, no apparent distress  EYES: PERRLA and symmetric, EOMI, No conjunctival or scleral injection, non-icteric  ENMT: Oral mucosa with moist membranes. Normal dentition; no pharyngeal injection or exudates             NECK: Supple, symmetric and without tracheal deviation   RESP: No respiratory distress, no use of accessory muscles; CTA b/l, no WRR  CV: RRR, +S1S2, no MRG; no JVD; no peripheral edema  GI: Soft, NT, ND, no rebound, no guarding; no palpable masses; no hepatosplenomegaly; no hernia palpated  LYMPH: No cervical LAD or tenderness; no axillary LAD or tenderness; no inguinal LAD or tenderness  MSK: Normal ROM without pain, no spinal tenderness, normal muscle strength/tone  SKIN: No rashes or ulcers noted; no subcutaneous nodules or induration palpable  NEURO: CN II-XII intact; normal reflexes in upper and lower extremities, sensation intact in upper and lower extremities b/l to light touch   PSYCH: Appropriate insight/judgment; A+O x 3, mood and affect appropriate, recent/remote memory intact

## 2023-02-16 NOTE — H&P ADULT - NSHPREVIEWOFSYSTEMS_GEN_ALL_CORE
Gen: ++ fever, +chills, +weakness  ENT: No visual changes or throat pain  Neck: No pain or stiffness  Respiratory: No cough or wheezing  Cardiovascular: No chest pain or palpitations  Gastrointestinal: No abdominal pain, nausea, vomiting, constipation, or diarrhea  Hematologic: No easy bleeding or bruising  Neurologic: No numbness or focal weakness  Psych: No depression or insomnia  Skin: No rash or itching

## 2023-02-16 NOTE — PATIENT PROFILE ADULT - PRO INTERPRETER NEED 2
After reviewing Dr. Valdez's Clinic note, Pt's US Results and speaking to Megan BAZAN, Alicia at Pt's care facility was notified of Results of US being Negative for DVT and confirmed that patient could discontinue taking Aspirin per Dr. Valdez's Plan in clinic note.   
[facility] Call Center    Phone Message    May a detailed message be left on voicemail: no    Reason for Call: Requesting Results   Name/type of test: ultrasound, pt;s care center needing to know results for pt's asprin  Date of test: 8/1/19  Was test done at a location other than [facility] (Please fill in the location if not [facility])?: No      Action Taken: Message routed to:  [facility] ([facility]): ortho  
English

## 2023-02-16 NOTE — H&P ADULT - ASSESSMENT
A/P:    1.  SIRS  Fever  Leucocytosis  Tachycardia  -No source of infection yet  -h/o Breast cancer  -will give IV empiric Antibiotics  -follow cultures  -follow clinically    2.  Hyponatremia  -on IVF  -follow Na level    3.  Heparin for DVT ppx    4.  Code status: Full code.      A/P:    1.  SIRS  Fever  Leucocytosis  Tachycardia  -No source of infection yet  -h/o Breast cancer  -will give IV empiric Antibiotics  -follow cultures  -follow clinically    2.  Hyponatremia  -on IVF  -follow Na level    3.  Pulmonary Nodule  -follow up in clinic    4.  Heparin for DVT ppx    5.  Code status: Full code.

## 2023-02-17 LAB
ANION GAP SERPL CALC-SCNC: 3 MMOL/L — LOW (ref 5–17)
BUN SERPL-MCNC: 7 MG/DL — SIGNIFICANT CHANGE UP (ref 7–23)
CALCIUM SERPL-MCNC: 8.4 MG/DL — LOW (ref 8.5–10.1)
CHLORIDE SERPL-SCNC: 112 MMOL/L — HIGH (ref 96–108)
CO2 SERPL-SCNC: 25 MMOL/L — SIGNIFICANT CHANGE UP (ref 22–31)
CREAT SERPL-MCNC: 0.51 MG/DL — SIGNIFICANT CHANGE UP (ref 0.5–1.3)
EGFR: 100 ML/MIN/1.73M2 — SIGNIFICANT CHANGE UP
GLUCOSE SERPL-MCNC: 147 MG/DL — HIGH (ref 70–99)
HCT VFR BLD CALC: 36.1 % — SIGNIFICANT CHANGE UP (ref 34.5–45)
HGB BLD-MCNC: 12 G/DL — SIGNIFICANT CHANGE UP (ref 11.5–15.5)
MCHC RBC-ENTMCNC: 29.7 PG — SIGNIFICANT CHANGE UP (ref 27–34)
MCHC RBC-ENTMCNC: 33.2 GM/DL — SIGNIFICANT CHANGE UP (ref 32–36)
MCV RBC AUTO: 89.4 FL — SIGNIFICANT CHANGE UP (ref 80–100)
PLATELET # BLD AUTO: 228 K/UL — SIGNIFICANT CHANGE UP (ref 150–400)
POTASSIUM SERPL-MCNC: 4.1 MMOL/L — SIGNIFICANT CHANGE UP (ref 3.5–5.3)
POTASSIUM SERPL-SCNC: 4.1 MMOL/L — SIGNIFICANT CHANGE UP (ref 3.5–5.3)
RBC # BLD: 4.04 M/UL — SIGNIFICANT CHANGE UP (ref 3.8–5.2)
RBC # FLD: 12.4 % — SIGNIFICANT CHANGE UP (ref 10.3–14.5)
SODIUM SERPL-SCNC: 140 MMOL/L — SIGNIFICANT CHANGE UP (ref 135–145)
WBC # BLD: 6.82 K/UL — SIGNIFICANT CHANGE UP (ref 3.8–10.5)
WBC # FLD AUTO: 6.82 K/UL — SIGNIFICANT CHANGE UP (ref 3.8–10.5)

## 2023-02-17 PROCEDURE — 99232 SBSQ HOSP IP/OBS MODERATE 35: CPT

## 2023-02-17 RX ADMIN — TAMOXIFEN CITRATE 20 MILLIGRAM(S): 20 TABLET, FILM COATED ORAL at 21:12

## 2023-02-17 RX ADMIN — CEFEPIME 100 MILLIGRAM(S): 1 INJECTION, POWDER, FOR SOLUTION INTRAMUSCULAR; INTRAVENOUS at 05:12

## 2023-02-17 RX ADMIN — OXYCODONE HYDROCHLORIDE 15 MILLIGRAM(S): 5 TABLET ORAL at 11:25

## 2023-02-17 RX ADMIN — Medication 3 MILLIGRAM(S): at 21:13

## 2023-02-17 RX ADMIN — Medication 10 MILLIGRAM(S): at 06:01

## 2023-02-17 RX ADMIN — HEPARIN SODIUM 5000 UNIT(S): 5000 INJECTION INTRAVENOUS; SUBCUTANEOUS at 13:40

## 2023-02-17 RX ADMIN — Medication 10 MILLIGRAM(S): at 21:12

## 2023-02-17 RX ADMIN — OXYCODONE HYDROCHLORIDE 15 MILLIGRAM(S): 5 TABLET ORAL at 18:40

## 2023-02-17 RX ADMIN — OXYCODONE HYDROCHLORIDE 15 MILLIGRAM(S): 5 TABLET ORAL at 12:25

## 2023-02-17 RX ADMIN — CEFEPIME 100 MILLIGRAM(S): 1 INJECTION, POWDER, FOR SOLUTION INTRAMUSCULAR; INTRAVENOUS at 15:55

## 2023-02-17 RX ADMIN — SODIUM CHLORIDE 100 MILLILITER(S): 9 INJECTION INTRAMUSCULAR; INTRAVENOUS; SUBCUTANEOUS at 09:29

## 2023-02-17 RX ADMIN — Medication 10 MILLIGRAM(S): at 13:39

## 2023-02-17 RX ADMIN — HEPARIN SODIUM 5000 UNIT(S): 5000 INJECTION INTRAVENOUS; SUBCUTANEOUS at 21:12

## 2023-02-17 RX ADMIN — Medication 1 TABLET(S): at 11:15

## 2023-02-17 RX ADMIN — OXYCODONE HYDROCHLORIDE 15 MILLIGRAM(S): 5 TABLET ORAL at 17:40

## 2023-02-17 RX ADMIN — HEPARIN SODIUM 5000 UNIT(S): 5000 INJECTION INTRAVENOUS; SUBCUTANEOUS at 06:01

## 2023-02-17 NOTE — CONSULT NOTE ADULT - ASSESSMENT
PROBLEMS:    SIRS/Fever/Leucocytosis/Tachycardia--No source of infection yet  Pulmonary Nodule  Hyponatremia  H/o Breast cancer    PLAN:    IV empiric Antibiotics-CEFEPIME  Follow cultures/follow clinically  IVF--follow Na level  Pulmonary Nodule-follow up in clinic-FU with repeat ct chest in three months  Heparin for DVT ppx

## 2023-02-17 NOTE — CONSULT NOTE ADULT - ASSESSMENT
73 y/o Female with h/o Breast cancer, prior pneumonia, hysterectomy was admitted on 2/16 for fever x1 day. She reported mild cough, but no chest pain or shortness of breath. She is also endorsing fatigue. No dysuria or polyuria. No medications taken prior to arrival.  Denies nausea, vomiting, abdominal pain. In ER she received cefepime.    1. Febrile syndrome.  73 y/o Female with h/o Breast cancer, prior pneumonia, hysterectomy was admitted on 2/16 for fever x1 day. She reported mild cough, but no chest pain or shortness of breath. She is also endorsing fatigue. No dysuria or polyuria. No medications taken prior to arrival.  Denies nausea, vomiting, abdominal pain. She had a sore throat.  In ER she received cefepime.    1. Febrile syndrome. Episode of chills. Acute pharyngitis. Pulmonary nodules.  -no further fever since hospitalization  -BC x 2 collected  -given cefepime 2 gm IV q12h  -reason for abx use and side effects reviewed with patient; monitor BMP   -continue abx for now pending further culture results  -old chart reviewed to assess prior cultures  -monitor temps  -f/u CBC  -supportive care  2. Other issues:   -care per medicine

## 2023-02-17 NOTE — CONSULT NOTE ADULT - SUBJECTIVE AND OBJECTIVE BOX
HPI:    72 Y.O.F with a past medical history of Breast cancer ADMITTED with fever x1 day.  Have mild cough but no chest pain or shortness of breath.  She is also endorsing fatigue.  No dysuria or polyuria.  No medications taken prior to arrival.  Denies nausea, vomiting, abdominal pain. No recent sick contact. No recent travel. pat admitted seen for pulmonary eval for abnormal CT chest, lying in bed, no respiratory distress.    PAST MEDICAL & SURGICAL HISTORY:  Colon polyps      H/O hemorrhoids      Pneumonia      Diverticular disease      History of hemorrhoids      Uterine fibroid      Bursitis of right hip      Breast CA  left breast s/p lumpectomy on immunotherapy, no chemotherapy or radiation therapy      H/O lumpectomy      S/P hysterectomy      S/P myomectomy      History of ankle surgery          Home Medications:  alendronate 70 mg oral tablet: 1 tab(s) orally once a week on Monday  (2023 16:15)  diazePAM 10 mg oral tablet: 1 tab(s) orally every 8 hours (2023 16:15)  meloxicam 7.5 mg oral tablet: 1 tab(s) orally once a day (2023 16:15)  Multiple Vitamins oral tablet: 1 tab(s) orally once a day (2023 16:40)  oxyCODONE 15 mg oral tablet: 1 tab(s) orally 5 times a day, As Needed for pain (2023 16:15)  tamoxifen 20 mg oral tablet: 1 tab(s) orally once a day (at bedtime) (2023 16:15)  Vitamin D3 1250 mcg (50,000 intl units) oral capsule: 1 cap(s) orally once a week on Monday (2023 16:15)      MEDICATIONS  (STANDING):  cefepime   IVPB 2000 milliGRAM(s) IV Intermittent every 12 hours  diazepam    Tablet 10 milliGRAM(s) Oral every 8 hours  heparin   Injectable 5000 Unit(s) SubCutaneous every 8 hours  multivitamin 1 Tablet(s) Oral daily  sodium chloride 0.9%. 1000 milliLiter(s) (100 mL/Hr) IV Continuous <Continuous>  tamoxifen 20 milliGRAM(s) Oral at bedtime    MEDICATIONS  (PRN):  acetaminophen     Tablet .. 650 milliGRAM(s) Oral every 6 hours PRN Temp greater or equal to 38C (100.4F), Mild Pain (1 - 3)  aluminum hydroxide/magnesium hydroxide/simethicone Suspension 30 milliLiter(s) Oral every 4 hours PRN Dyspepsia  melatonin 3 milliGRAM(s) Oral at bedtime PRN Insomnia  ondansetron Injectable 4 milliGRAM(s) IV Push every 8 hours PRN Nausea and/or Vomiting  oxyCODONE    IR 15 milliGRAM(s) Oral four times a day PRN Moderate Pain (4 - 6)      Allergies    No Known Allergies    Intolerances        SOCIAL HISTORY: Denies tobacco, etoh abuse or illicit drug use    FAMILY HISTORY:  FH: rheumatic heart disease (Mother)    FH: breast cancer (Grandparent)    FH: CAD (coronary artery disease) (Father)        Vital Signs Last 24 Hrs  T(C): 36.8 (2023 07:35), Max: 38.3 (2023 14:06)  T(F): 98.2 (2023 07:35), Max: 100.9 (2023 14:06)  HR: 98 (2023 11:25) (74 - 98)  BP: 137/79 (2023 11:25) (98/62 - 139/79)  BP(mean): 77 (2023 16:41) (77 - 77)  RR: 18 (2023 11:25) (16 - 19)  SpO2: 96% (2023 11:25) (94% - 98%)    Parameters below as of 2023 11:25  Patient On (Oxygen Delivery Method): room air            REVIEW OF SYSTEMS:    CONSTITUTIONAL:  As per HPI.  HEENT:  Eyes:  No diplopia or blurred vision. ENT:  No earache, sore throat or runny nose.  CARDIOVASCULAR:  No pressure, squeezing, tightness, heaviness or aching about the chest, neck, axilla or epigastrium.  RESPIRATORY:  No cough, +shortness of breath, PND or orthopnea.  GASTROINTESTINAL:  No nausea, vomiting or diarrhea.  GENITOURINARY:  No dysuria, frequency or urgency.  MUSCULOSKELETAL:  As per HPI.  SKIN:  No change in skin, hair or nails.  NEUROLOGIC:  No paresthesias, fasciculations, seizures or weakness.  PSYCHIATRIC:  No disorder of thought or mood.  ENDOCRINE:  No heat or cold intolerance, polyuria or polydipsia.  HEMATOLOGICAL:  No easy bruising or bleedings:  .     PHYSICAL EXAMINATION:    GENERAL APPEARANCE:  Pt. is not currently dyspneic, in no distress. Pt. is alert, oriented, and pleasant.  HEENT:  Pupils are normal and react normally. No icterus. Mucous membranes well colored.  NECK:  Supple. No lymphadenopathy. Jugular venous pressure not elevated. Carotids equal.   HEART:   The cardiac impulse has a normal quality. Regular. Normal S1 and S2. There are no murmurs, rubs or gallops noted  CHEST:  Chest crackles to auscultation. Normal respiratory effort.  ABDOMEN:  Soft and nontender.   EXTREMITIES:  There is no cyanosis, clubbing or edema.   SKIN:  No rash or significant lesions are noted.    LABS:                        12.0   6.82  )-----------( 228      ( 2023 06:50 )             36.1         140  |  112<H>  |  7   ----------------------------<  147<H>  4.1   |  25  |  0.51    Ca    8.4<L>      2023 06:50  Mg     1.8     -    TPro  6.3  /  Alb  3.1<L>  /  TBili  0.4  /  DBili  x   /  AST  19  /  ALT  24  /  AlkPhos  42  02-16    LIVER FUNCTIONS - ( 2023 14:21 )  Alb: 3.1 g/dL / Pro: 6.3 gm/dL / ALK PHOS: 42 U/L / ALT: 24 U/L / AST: 19 U/L / GGT: x                 Urinalysis Basic - ( 2023 16:28 )    Color: Yellow / Appearance: Clear / S.010 / pH: x  Gluc: x / Ketone: Negative  / Bili: Negative / Urobili: Negative   Blood: x / Protein: Negative / Nitrite: Negative   Leuk Esterase: Negative / RBC: x / WBC x   Sq Epi: x / Non Sq Epi: x / Bacteria: x            RADIOLOGY & ADDITIONAL STUDIES:     CT Angio Chest PE Protocol w/ IV Cont (23 @ 15:08) >  IMPRESSION:    1.  No pulmonary thromboembolism    2.  Lung nodules measuring up to 6 mm not clearly present on prior study.   Repeat chest CT scan is recommended in 3 months for reassessment given   history of lung cancer.    3.  Marked improvement of peribronchovascular consolidation/density in   the lower lobes. Reassessment for complete resolution can be made on the   above recommended chest CT

## 2023-02-17 NOTE — CONSULT NOTE ADULT - SUBJECTIVE AND OBJECTIVE BOX
Patient is a 72y old  Female who presents with a chief complaint of fever x1 day     HPI:  73 y/o Female with h/o Breast cancer, prior pneumonia, hysterectomy was admitted on  for fever x1 day. She reported mild cough, but no chest pain or shortness of breath. She is also endorsing fatigue. No dysuria or polyuria. No medications taken prior to arrival.  Denies nausea, vomiting, abdominal pain. In ER she received cefepime.    PAST MEDICAL & SURGICAL HISTORY:  Colon polyps  Pneumonia  Diverticular disease  History of hemorrhoids  Uterine fibroid  Bursitis of right hip  Breast CA  left breast s/p lumpectomy on immunotherapy, no chemotherapy or radiation therapy  S/P hysterectomy  S/P myomectomy  History of ankle surgery    Meds: per reconciliation sheet, noted below  MEDICATIONS  (STANDING):  cefepime   IVPB 2000 milliGRAM(s) IV Intermittent every 12 hours  diazepam    Tablet 10 milliGRAM(s) Oral every 8 hours  heparin   Injectable 5000 Unit(s) SubCutaneous every 8 hours  multivitamin 1 Tablet(s) Oral daily  sodium chloride 0.9%. 1000 milliLiter(s) (100 mL/Hr) IV Continuous <Continuous>  tamoxifen 20 milliGRAM(s) Oral at bedtime    MEDICATIONS  (PRN):  acetaminophen     Tablet .. 650 milliGRAM(s) Oral every 6 hours PRN Temp greater or equal to 38C (100.4F), Mild Pain (1 - 3)  aluminum hydroxide/magnesium hydroxide/simethicone Suspension 30 milliLiter(s) Oral every 4 hours PRN Dyspepsia  melatonin 3 milliGRAM(s) Oral at bedtime PRN Insomnia  ondansetron Injectable 4 milliGRAM(s) IV Push every 8 hours PRN Nausea and/or Vomiting  oxyCODONE    IR 15 milliGRAM(s) Oral four times a day PRN Moderate Pain (4 - 6)    Allergies    No Known Allergies    Intolerances      Social: no smoking, no alcohol, no illegal drugs; no recent travel, no exposure to TB  FAMILY HISTORY:  FH: rheumatic heart disease (Mother)    FH: breast cancer (Grandparent)    FH: CAD (coronary artery disease) (Father)      no history of premature cardiovascular disease in first degree relatives    ROS: the patient denies HA, no seizures, no dizziness, no sore throat, no nasal congestion, no blurry vision, no CP, no palpitations, no SOB, no cough, no abdominal pain, no diarrhea, no N/V, no dysuria, no leg pain, no claudication, no rash, no joint aches, no rectal pain or bleeding, no night sweats  All other systems reviewed and are negative    Vital Signs Last 24 Hrs  T(C): 36.6 (2023 13:39), Max: 36.9 (2023 20:30)  T(F): 97.9 (2023 13:39), Max: 98.4 (2023 20:30)  HR: 99 (2023 13:39) (74 - 99)  BP: 120/73 (2023 13:39) (98/62 - 139/79)  BP(mean): 77 (2023 16:41) (77 - 77)  RR: 18 (2023 13:39) (16 - 19)  SpO2: 96% (2023 13:39) (94% - 98%)    Parameters below as of 2023 13:39  Patient On (Oxygen Delivery Method): room air      Daily Height in cm: 160.02 (2023 18:40)    Daily Weight in k.3 (2023 18:40)    PE:    Constitutional:  No acute distress  HEENT: NC/AT, EOMI, PERRLA, conjunctivae clear; ears and nose atraumatic; pharynx benign  Neck: supple; thyroid not palpable  Back: no tenderness  Respiratory: respiratory effort normal; clear to auscultation  Cardiovascular: S1S2 regular, no murmurs  Abdomen: soft, not tender, not distended, positive BS; no liver or spleen organomegaly  Genitourinary: no suprapubic tenderness  Lymphatic: no LN palpable  Musculoskeletal: no muscle tenderness, no joint swelling or tenderness  Extremities: no pedal edema  Neurological/ Psychiatric: AxOx3, judgement and insight normal; moving all extremities  Skin: no rashes; no palpable lesions    Labs: all available labs reviewed                        12.0   6.82  )-----------( 228      ( 2023 06:50 )             36.1     02-17    140  |  112<H>  |  7   ----------------------------<  147<H>  4.1   |  25  |  0.51    Ca    8.4<L>      2023 06:50  Mg     1.8         TPro  6.3  /  Alb  3.1<L>  /  TBili  0.4  /  DBili  x   /  AST  19  /  ALT  24  /  AlkPhos  42  -     LIVER FUNCTIONS - ( 2023 14:21 )  Alb: 3.1 g/dL / Pro: 6.3 gm/dL / ALK PHOS: 42 U/L / ALT: 24 U/L / AST: 19 U/L / GGT: x           Urinalysis Basic - ( 2023 16:28 )    Color: Yellow / Appearance: Clear / S.010 / pH: x  Gluc: x / Ketone: Negative  / Bili: Negative / Urobili: Negative   Blood: x / Protein: Negative / Nitrite: Negative   Leuk Esterase: Negative / RBC: x / WBC x   Sq Epi: x / Non Sq Epi: x / Bacteria: x    ( @ 14:21)  [facility]    Radiology: all available radiological tests reviewed    < from: CT Angio Chest PE Protocol w/ IV Cont (23 @ 15:08) >  1.  No pulmonary thromboembolism  2.  Lung nodules measuring up to 6 mm not clearly present on prior study.   Repeat chest CT scan is recommended in 3 months for reassessment given history of lung cancer.  3.  Marked improvement of peribronchovascular consolidation/density in   the lower lobes. Reassessment for complete resolution can be made on the above recommended chest CT  < end of copied text >    Advanced directives addressed: full resuscitation Patient is a 72y old  Female who presents with a chief complaint of fever x1 day     HPI:  71 y/o Female with h/o Breast cancer, prior pneumonia, hysterectomy was admitted on  for fever x1 day. She reported mild cough, but no chest pain or shortness of breath. She is also endorsing fatigue. No dysuria or polyuria. No medications taken prior to arrival.  Denies nausea, vomiting, abdominal pain. She had a sore throat. In ER she received cefepime.    PAST MEDICAL & SURGICAL HISTORY:  Colon polyps  Pneumonia  Diverticular disease  History of hemorrhoids  Uterine fibroid  Bursitis of right hip  Breast CA  left breast s/p lumpectomy on immunotherapy, no chemotherapy or radiation therapy  S/P hysterectomy  S/P myomectomy  History of ankle surgery    Meds: per reconciliation sheet, noted below  MEDICATIONS  (STANDING):  cefepime   IVPB 2000 milliGRAM(s) IV Intermittent every 12 hours  diazepam    Tablet 10 milliGRAM(s) Oral every 8 hours  heparin   Injectable 5000 Unit(s) SubCutaneous every 8 hours  multivitamin 1 Tablet(s) Oral daily  sodium chloride 0.9%. 1000 milliLiter(s) (100 mL/Hr) IV Continuous <Continuous>  tamoxifen 20 milliGRAM(s) Oral at bedtime    MEDICATIONS  (PRN):  acetaminophen     Tablet .. 650 milliGRAM(s) Oral every 6 hours PRN Temp greater or equal to 38C (100.4F), Mild Pain (1 - 3)  aluminum hydroxide/magnesium hydroxide/simethicone Suspension 30 milliLiter(s) Oral every 4 hours PRN Dyspepsia  melatonin 3 milliGRAM(s) Oral at bedtime PRN Insomnia  ondansetron Injectable 4 milliGRAM(s) IV Push every 8 hours PRN Nausea and/or Vomiting  oxyCODONE    IR 15 milliGRAM(s) Oral four times a day PRN Moderate Pain (4 - 6)    Allergies    No Known Allergies    Intolerances      Social: no smoking, no alcohol, no illegal drugs; no recent travel, no exposure to TB  FAMILY HISTORY:  FH: rheumatic heart disease (Mother)    FH: breast cancer (Grandparent)    FH: CAD (coronary artery disease) (Father)      no history of premature cardiovascular disease in first degree relatives    ROS: the patient denies HA, no seizures, no dizziness, no sore throat, no nasal congestion, no blurry vision, no CP, no palpitations, no SOB, no cough, no abdominal pain, no diarrhea, no N/V, no dysuria, no leg pain, no claudication, no rash, no joint aches, no rectal pain or bleeding, no night sweats  All other systems reviewed and are negative    Vital Signs Last 24 Hrs  T(C): 36.6 (2023 13:39), Max: 36.9 (2023 20:30)  T(F): 97.9 (2023 13:39), Max: 98.4 (2023 20:30)  HR: 99 (2023 13:39) (74 - 99)  BP: 120/73 (2023 13:39) (98/62 - 139/79)  BP(mean): 77 (2023 16:41) (77 - 77)  RR: 18 (2023 13:39) (16 - 19)  SpO2: 96% (2023 13:39) (94% - 98%)    Parameters below as of 2023 13:39  Patient On (Oxygen Delivery Method): room air      Daily Height in cm: 160.02 (2023 18:40)    Daily Weight in k.3 (2023 18:40)    PE:    Constitutional:  No acute distress  HEENT: NC/AT, EOMI, PERRLA, conjunctivae clear; ears and nose atraumatic; pharynx erythema  Neck: supple; thyroid not palpable  Back: no tenderness  Respiratory: respiratory effort normal; clear to auscultation  Cardiovascular: S1S2 regular, no murmurs  Abdomen: soft, not tender, not distended, positive BS; no liver or spleen organomegaly  Genitourinary: no suprapubic tenderness  Lymphatic: no LN palpable  Musculoskeletal: no muscle tenderness, no joint swelling or tenderness  Extremities: no pedal edema  Neurological/ Psychiatric: AxOx3, judgement and insight normal; moving all extremities  Skin: no rashes; no palpable lesions    Labs: all available labs reviewed                        12.0   6.82  )-----------( 228      ( 2023 06:50 )             36.1     02-17    140  |  112<H>  |  7   ----------------------------<  147<H>  4.1   |  25  |  0.51    Ca    8.4<L>      2023 06:50  Mg     1.8         TPro  6.3  /  Alb  3.1<L>  /  TBili  0.4  /  DBili  x   /  AST  19  /  ALT  24  /  AlkPhos  42       LIVER FUNCTIONS - ( 2023 14:21 )  Alb: 3.1 g/dL / Pro: 6.3 gm/dL / ALK PHOS: 42 U/L / ALT: 24 U/L / AST: 19 U/L / GGT: x           Urinalysis Basic - ( 2023 16:28 )    Color: Yellow / Appearance: Clear / S.010 / pH: x  Gluc: x / Ketone: Negative  / Bili: Negative / Urobili: Negative   Blood: x / Protein: Negative / Nitrite: Negative   Leuk Esterase: Negative / RBC: x / WBC x   Sq Epi: x / Non Sq Epi: x / Bacteria: x    ( @ 14:21)  [facility]    Radiology: all available radiological tests reviewed    < from: CT Angio Chest PE Protocol w/ IV Cont (23 @ 15:08) >  1.  No pulmonary thromboembolism  2.  Lung nodules measuring up to 6 mm not clearly present on prior study.   Repeat chest CT scan is recommended in 3 months for reassessment given history of lung cancer.  3.  Marked improvement of peribronchovascular consolidation/density in   the lower lobes. Reassessment for complete resolution can be made on the above recommended chest CT  < end of copied text >    Advanced directives addressed: full resuscitation

## 2023-02-18 ENCOUNTER — TRANSCRIPTION ENCOUNTER (OUTPATIENT)
Age: 73
End: 2023-02-18

## 2023-02-18 LAB
ANION GAP SERPL CALC-SCNC: 5 MMOL/L — SIGNIFICANT CHANGE UP (ref 5–17)
BUN SERPL-MCNC: 9 MG/DL — SIGNIFICANT CHANGE UP (ref 7–23)
CALCIUM SERPL-MCNC: 8.7 MG/DL — SIGNIFICANT CHANGE UP (ref 8.5–10.1)
CHLORIDE SERPL-SCNC: 113 MMOL/L — HIGH (ref 96–108)
CO2 SERPL-SCNC: 25 MMOL/L — SIGNIFICANT CHANGE UP (ref 22–31)
CREAT SERPL-MCNC: 0.61 MG/DL — SIGNIFICANT CHANGE UP (ref 0.5–1.3)
CULTURE RESULTS: SIGNIFICANT CHANGE UP
EGFR: 96 ML/MIN/1.73M2 — SIGNIFICANT CHANGE UP
GLUCOSE SERPL-MCNC: 128 MG/DL — HIGH (ref 70–99)
HCT VFR BLD CALC: 37.1 % — SIGNIFICANT CHANGE UP (ref 34.5–45)
HGB BLD-MCNC: 12.4 G/DL — SIGNIFICANT CHANGE UP (ref 11.5–15.5)
MCHC RBC-ENTMCNC: 29.8 PG — SIGNIFICANT CHANGE UP (ref 27–34)
MCHC RBC-ENTMCNC: 33.4 GM/DL — SIGNIFICANT CHANGE UP (ref 32–36)
MCV RBC AUTO: 89.2 FL — SIGNIFICANT CHANGE UP (ref 80–100)
PLATELET # BLD AUTO: 252 K/UL — SIGNIFICANT CHANGE UP (ref 150–400)
POTASSIUM SERPL-MCNC: 3.9 MMOL/L — SIGNIFICANT CHANGE UP (ref 3.5–5.3)
POTASSIUM SERPL-SCNC: 3.9 MMOL/L — SIGNIFICANT CHANGE UP (ref 3.5–5.3)
RBC # BLD: 4.16 M/UL — SIGNIFICANT CHANGE UP (ref 3.8–5.2)
RBC # FLD: 12.7 % — SIGNIFICANT CHANGE UP (ref 10.3–14.5)
SODIUM SERPL-SCNC: 143 MMOL/L — SIGNIFICANT CHANGE UP (ref 135–145)
SPECIMEN SOURCE: SIGNIFICANT CHANGE UP
WBC # BLD: 6.49 K/UL — SIGNIFICANT CHANGE UP (ref 3.8–10.5)
WBC # FLD AUTO: 6.49 K/UL — SIGNIFICANT CHANGE UP (ref 3.8–10.5)

## 2023-02-18 PROCEDURE — 99232 SBSQ HOSP IP/OBS MODERATE 35: CPT

## 2023-02-18 RX ORDER — CEPHALEXIN 500 MG
1 CAPSULE ORAL
Qty: 14 | Refills: 0
Start: 2023-02-18 | End: 2023-02-24

## 2023-02-18 RX ADMIN — HEPARIN SODIUM 5000 UNIT(S): 5000 INJECTION INTRAVENOUS; SUBCUTANEOUS at 14:13

## 2023-02-18 RX ADMIN — OXYCODONE HYDROCHLORIDE 15 MILLIGRAM(S): 5 TABLET ORAL at 19:30

## 2023-02-18 RX ADMIN — Medication 10 MILLIGRAM(S): at 06:00

## 2023-02-18 RX ADMIN — OXYCODONE HYDROCHLORIDE 15 MILLIGRAM(S): 5 TABLET ORAL at 11:45

## 2023-02-18 RX ADMIN — SODIUM CHLORIDE 100 MILLILITER(S): 9 INJECTION INTRAMUSCULAR; INTRAVENOUS; SUBCUTANEOUS at 17:25

## 2023-02-18 RX ADMIN — HEPARIN SODIUM 5000 UNIT(S): 5000 INJECTION INTRAVENOUS; SUBCUTANEOUS at 06:00

## 2023-02-18 RX ADMIN — Medication 10 MILLIGRAM(S): at 21:56

## 2023-02-18 RX ADMIN — Medication 1 TABLET(S): at 11:28

## 2023-02-18 RX ADMIN — Medication 10 MILLIGRAM(S): at 14:13

## 2023-02-18 RX ADMIN — SODIUM CHLORIDE 100 MILLILITER(S): 9 INJECTION INTRAMUSCULAR; INTRAVENOUS; SUBCUTANEOUS at 04:15

## 2023-02-18 RX ADMIN — OXYCODONE HYDROCHLORIDE 15 MILLIGRAM(S): 5 TABLET ORAL at 12:30

## 2023-02-18 RX ADMIN — CEFEPIME 100 MILLIGRAM(S): 1 INJECTION, POWDER, FOR SOLUTION INTRAMUSCULAR; INTRAVENOUS at 04:15

## 2023-02-18 RX ADMIN — TAMOXIFEN CITRATE 20 MILLIGRAM(S): 20 TABLET, FILM COATED ORAL at 21:56

## 2023-02-18 RX ADMIN — HEPARIN SODIUM 5000 UNIT(S): 5000 INJECTION INTRAVENOUS; SUBCUTANEOUS at 21:56

## 2023-02-18 RX ADMIN — CEFEPIME 100 MILLIGRAM(S): 1 INJECTION, POWDER, FOR SOLUTION INTRAMUSCULAR; INTRAVENOUS at 15:34

## 2023-02-18 RX ADMIN — OXYCODONE HYDROCHLORIDE 15 MILLIGRAM(S): 5 TABLET ORAL at 18:51

## 2023-02-18 NOTE — DISCHARGE NOTE PROVIDER - CARE PROVIDER_API CALL
MARQUITA MURPHY  [facility]  N  KATHERINE JUÁREZ DO,    Phone: [phone]  Fax: [phone]  Follow Up Time:

## 2023-02-18 NOTE — DISCHARGE NOTE PROVIDER - NSDCMRMEDTOKEN_GEN_ALL_CORE_FT
alendronate 70 mg oral tablet: 1 tab(s) orally once a week on Monday   cephalexin 500 mg oral tablet: 1 tab(s) orally 2 times a day   diazePAM 10 mg oral tablet: 1 tab(s) orally every 8 hours  meloxicam 7.5 mg oral tablet: 1 tab(s) orally once a day  Multiple Vitamins oral tablet: 1 tab(s) orally once a day  oxyCODONE 15 mg oral tablet: 1 tab(s) orally 5 times a day, As Needed for pain  tamoxifen 20 mg oral tablet: 1 tab(s) orally once a day (at bedtime)  Vitamin D3 1250 mcg (50,000 intl units) oral capsule: 1 cap(s) orally once a week on Monday

## 2023-02-18 NOTE — DISCHARGE NOTE PROVIDER - HOSPITAL COURSE
HPI:73 yo Female with a past medical history of Breast cancer presented with fever x1 day. Have mild cough but no chest pain or shortness of breath. She is also endorsing fatigue. Patient meeting sepsis criteria on admission, with likely upper respiratory infectious source. Patient reported sore throat and pain with swallowing for few days. She was admitted and started on cefepime with improvement of symptoms, she remained afebrile during hospitalization. Leukocytosis resolved. Blood and urine cultures were negative. Patient is medically optimized for discharge today. Plan to continue with keflex 500mg BID for 7 days. Plan for discharge discussed with patient, all questions and concerns addressed.       Subjective: Patient seen today, doing well, no complaints to offer. No further sore throat. Remains afebrile.     Vital Signs Last 24 Hrs  T(C): 36.6 (18 Feb 2023 08:09), Max: 37.2 (17 Feb 2023 20:34)  T(F): 97.9 (18 Feb 2023 08:09), Max: 99 (17 Feb 2023 20:34)  HR: 92 (18 Feb 2023 08:09) (91 - 108)  BP: 114/85 (18 Feb 2023 08:09) (111/89 - 146/86)  RR: 18 (18 Feb 2023 08:09) (18 - 18)  SpO2: 96% (18 Feb 2023 08:09) (95% - 96%)    Parameters below as of 18 Feb 2023 08:09  Patient On (Oxygen Delivery Method): room air      PHYSICAL EXAM:  GENERAL: NAD, lying in bed comfortably  HEAD:  Atraumatic, Normocephalic  EYES: conjunctiva and sclera clear  ENT: Moist mucous membranes  CHEST/LUNG: Clear to auscultation bilaterally; No rales, rhonchi, wheezing, or rubs. Unlabored respirations  HEART: Regular rate and rhythm; No murmurs, rubs, or gallops  ABDOMEN: Bowel sounds present; Soft, Nontender, Nondistended.   EXTREMITIES:  2+ Peripheral Pulses, brisk capillary refill. No clubbing, cyanosis, or edema  NERVOUS SYSTEM:  Alert & Oriented X3, speech clear. No deficits   MSK: FROM all 4 extremities        LABS:                          12.4   6.49  )-----------( 252      ( 18 Feb 2023 07:28 )             37.1     18 Feb 2023 07:28    143    |  113    |  9      ----------------------------<  128    3.9     |  25     |  0.61     Ca    8.7        18 Feb 2023 07:28  Mg     1.8       16 Feb 2023 14:21    TPro  6.3    /  Alb  3.1    /  TBili  0.4    /  DBili  x      /  AST  19     /  ALT  24     /  AlkPhos  42     16 Feb 2023 14:21    LIVER FUNCTIONS - ( 16 Feb 2023 14:21 )  Alb: 3.1 g/dL / Pro: 6.3 gm/dL / ALK PHOS: 42 U/L / ALT: 24 U/L / AST: 19 U/L / GGT: x                      HPI:73 yo Female with a past medical history of Breast cancer presented with fever x1 day. Have mild cough but no chest pain or shortness of breath. She is also endorsing fatigue. Patient meeting sepsis criteria on admission, with likely upper respiratory infectious source. Patient reported sore throat and pain with swallowing for few days. She was admitted and started on cefepime with improvement of symptoms, she remained afebrile during hospitalization. Leukocytosis resolved. Blood and urine cultures were negative. Patient is medically optimized for discharge today. Plan to continue with keflex 500mg BID for 7 days. Plan for discharge discussed with patient, all questions and concerns addressed.       Subjective: Patient seen today, doing well, no complaints to offer.     Vital Signs Last 24 Hrs  T(C): 36.9 (19 Feb 2023 08:51), Max: 37 (18 Feb 2023 23:31)  T(F): 98.4 (19 Feb 2023 08:51), Max: 98.6 (18 Feb 2023 23:31)  HR: 89 (19 Feb 2023 08:51) (83 - 89)  BP: 152/79 (19 Feb 2023 08:51) (125/81 - 154/82)  RR: 18 (19 Feb 2023 08:51) (17 - 18)  SpO2: 96% (19 Feb 2023 08:51) (96% - 96%)    Parameters below as of 19 Feb 2023 08:51  Patient On (Oxygen Delivery Method): room air          PHYSICAL EXAM:  GENERAL: NAD, lying in bed comfortably  HEAD:  Atraumatic, Normocephalic  EYES: conjunctiva and sclera clear  ENT: Moist mucous membranes  CHEST/LUNG: Clear to auscultation bilaterally; No rales, rhonchi, wheezing, or rubs. Unlabored respirations  HEART: Regular rate and rhythm; No murmurs, rubs, or gallops  ABDOMEN: Bowel sounds present; Soft, Nontender, Nondistended.   EXTREMITIES:  2+ Peripheral Pulses, brisk capillary refill. No clubbing, cyanosis, or edema  NERVOUS SYSTEM:  Alert & Oriented X3, speech clear. No deficits   MSK: FROM all 4 extremities        LABS:                          12.4   6.49  )-----------( 252      ( 18 Feb 2023 07:28 )             37.1     18 Feb 2023 07:28    143    |  113    |  9      ----------------------------<  128    3.9     |  25     |  0.61     Ca    8.7        18 Feb 2023 07:28  Mg     1.8       16 Feb 2023 14:21    TPro  6.3    /  Alb  3.1    /  TBili  0.4    /  DBili  x      /  AST  19     /  ALT  24     /  AlkPhos  42     16 Feb 2023 14:21    LIVER FUNCTIONS - ( 16 Feb 2023 14:21 )  Alb: 3.1 g/dL / Pro: 6.3 gm/dL / ALK PHOS: 42 U/L / ALT: 24 U/L / AST: 19 U/L / GGT: x

## 2023-02-18 NOTE — DISCHARGE NOTE PROVIDER - NSDCCPCAREPLAN_GEN_ALL_CORE_FT
PRINCIPAL DISCHARGE DIAGNOSIS  Diagnosis: Sepsis  Assessment and Plan of Treatment: Admitted for sepsis secondart to acute pharyngitis, treated with cefepime with improvement. Blood and urine cultures were negative. Continue with keflex 500mg twice dialyf or 7 more days.      SECONDARY DISCHARGE DIAGNOSES  Diagnosis: Pulmonary nodule  Assessment and Plan of Treatment: Incidental finding on imaging on admission, measuring up to 6mm. Follow up with your primary care physician for repeat imaging in 3 months.

## 2023-02-19 ENCOUNTER — TRANSCRIPTION ENCOUNTER (OUTPATIENT)
Age: 73
End: 2023-02-19

## 2023-02-19 VITALS
TEMPERATURE: 98 F | DIASTOLIC BLOOD PRESSURE: 79 MMHG | OXYGEN SATURATION: 96 % | SYSTOLIC BLOOD PRESSURE: 152 MMHG | RESPIRATION RATE: 18 BRPM | HEART RATE: 89 BPM

## 2023-02-19 PROCEDURE — 99238 HOSP IP/OBS DSCHRG MGMT 30/<: CPT

## 2023-02-19 RX ADMIN — OXYCODONE HYDROCHLORIDE 15 MILLIGRAM(S): 5 TABLET ORAL at 09:20

## 2023-02-19 RX ADMIN — Medication 10 MILLIGRAM(S): at 05:45

## 2023-02-19 RX ADMIN — OXYCODONE HYDROCHLORIDE 15 MILLIGRAM(S): 5 TABLET ORAL at 08:47

## 2023-02-19 RX ADMIN — Medication 1 TABLET(S): at 11:48

## 2023-02-19 RX ADMIN — HEPARIN SODIUM 5000 UNIT(S): 5000 INJECTION INTRAVENOUS; SUBCUTANEOUS at 13:22

## 2023-02-19 RX ADMIN — Medication 10 MILLIGRAM(S): at 13:21

## 2023-02-19 RX ADMIN — HEPARIN SODIUM 5000 UNIT(S): 5000 INJECTION INTRAVENOUS; SUBCUTANEOUS at 05:44

## 2023-02-19 RX ADMIN — CEFEPIME 100 MILLIGRAM(S): 1 INJECTION, POWDER, FOR SOLUTION INTRAMUSCULAR; INTRAVENOUS at 04:06

## 2023-02-19 NOTE — PROGRESS NOTE ADULT - ASSESSMENT
*SIRS (fever, leukocytosis, tachycardia), upper respiratory source - pharyngitis   - lactate 1.3  - urine and blood cultures NGTD  - ID consult appreciated   - leukocytosis resolved  - on cefepime IV  - monitor temp  - chest CT - several lung nodules 5mm in the right lower lobe and 6mm left lower lobe   - pulmonary consult appreciated     *hyponatremia  - resolved     *history of breast cancer   - tamoxifen    dvt prophylaxis- heparin       DISPO: D/C tomorrow morning,  needs time to prepare home 
71 y/o Female with h/o Breast cancer, prior pneumonia, hysterectomy was admitted on 2/16 for fever x1 day. She reported mild cough, but no chest pain or shortness of breath. She is also endorsing fatigue. No dysuria or polyuria. No medications taken prior to arrival.  Denies nausea, vomiting, abdominal pain. She had a sore throat.  In ER she received cefepime.    1. Febrile syndrome. Episode of chills. Acute pharyngitis. Pulmonary nodules.  -no further fever since hospitalization  -BC x 2 noted  -on cefepime 2 gm IV q12h # 1-2  -tolerating abx well so far; no side effects noted  -continue abx coverage for now  -f/u cultures   -monitor temps  -f/u CBC  -supportive care  2. Other issues:   -care per medicine  
PROBLEMS:    SIRS/Fever/Leucocytosis/Tachycardia--No source of infection yet  Pulmonary Nodule  Hyponatremia  H/o Breast cancer    PLAN:    pulmonary stable-d/w -fu ct as outpat  IV empiric Antibiotics-CEFEPIME  Follow cultures/follow clinically  IVF--follow Na level  Pulmonary Nodule-follow up in clinic-FU with repeat ct chest in three months  Heparin for DVT ppx  
*SIRS (fever, leukocytosis, tachycardia), upper respiratory source - pharyngitis   - lactate 1.3  - urine and blood cultures NGTD  - ID consult appreciated   - leukocytosis resolved  - on cefepime IV  - monitor temp  - chest CT - several lung nodules 5mm in the right lower lobe and 6mm left lower lobe   - pulmonary consult appreciated     *hyponatremia  - resolved     *history of breast cancer   - tamoxifen    dvt prophylaxis- heparin       DISPO: D/C today 
A/P    *SIRS (fever. leukocytosis, tachycardia)  - workup negative for far   - lactate 1.3  - ?viral illness   - follow urine and blood cultures  - ID consult  - leukocytosis resolved  - on cefepime IV  - monitor temp  - chest CT - several lung nodules 5mm in the right lower lobe and 6mm left lower lobe   - pulmonary consult  - monitor    *hyponatremia  - resolved     *history of breast cancer   - tamoxifen    dvt prophylaxis- heparin       Case d/w team on IDR.         
PROBLEMS:    SIRS/Fever/Leucocytosis/Tachycardia--No source of infection yet  Pulmonary Nodule  Hyponatremia  H/o Breast cancer    PLAN:    pulmonary stable-d/w -fu ct as outpat  IV empiric Antibiotics-CEFEPIME  Follow cultures/follow clinically  IVF--follow Na level  Pulmonary Nodule-follow up in clinic-FU with repeat ct chest in three months  D/w pat &   Heparin for DVT ppx

## 2023-02-19 NOTE — PROGRESS NOTE ADULT - SUBJECTIVE AND OBJECTIVE BOX
Subjective:    pat sitting in bed, feeling better, no new complaint.    Home Medications:  alendronate 70 mg oral tablet: 1 tab(s) orally once a week on Monday  (16 Feb 2023 16:15)  diazePAM 10 mg oral tablet: 1 tab(s) orally every 8 hours (16 Feb 2023 16:15)  meloxicam 7.5 mg oral tablet: 1 tab(s) orally once a day (16 Feb 2023 16:15)  Multiple Vitamins oral tablet: 1 tab(s) orally once a day (16 Feb 2023 16:40)  oxyCODONE 15 mg oral tablet: 1 tab(s) orally 5 times a day, As Needed for pain (16 Feb 2023 16:15)  tamoxifen 20 mg oral tablet: 1 tab(s) orally once a day (at bedtime) (16 Feb 2023 16:15)  Vitamin D3 1250 mcg (50,000 intl units) oral capsule: 1 cap(s) orally once a week on Monday (16 Feb 2023 16:15)    MEDICATIONS  (STANDING):  cefepime   IVPB 2000 milliGRAM(s) IV Intermittent every 12 hours  diazepam    Tablet 10 milliGRAM(s) Oral every 8 hours  heparin   Injectable 5000 Unit(s) SubCutaneous every 8 hours  multivitamin 1 Tablet(s) Oral daily  sodium chloride 0.9%. 1000 milliLiter(s) (100 mL/Hr) IV Continuous <Continuous>  tamoxifen 20 milliGRAM(s) Oral at bedtime    MEDICATIONS  (PRN):  acetaminophen     Tablet .. 650 milliGRAM(s) Oral every 6 hours PRN Temp greater or equal to 38C (100.4F), Mild Pain (1 - 3)  aluminum hydroxide/magnesium hydroxide/simethicone Suspension 30 milliLiter(s) Oral every 4 hours PRN Dyspepsia  melatonin 3 milliGRAM(s) Oral at bedtime PRN Insomnia  ondansetron Injectable 4 milliGRAM(s) IV Push every 8 hours PRN Nausea and/or Vomiting  oxyCODONE    IR 15 milliGRAM(s) Oral four times a day PRN Moderate Pain (4 - 6)      Allergies    No Known Allergies    Intolerances        Vital Signs Last 24 Hrs  T(C): 36.9 (19 Feb 2023 08:51), Max: 37 (18 Feb 2023 23:31)  T(F): 98.4 (19 Feb 2023 08:51), Max: 98.6 (18 Feb 2023 23:31)  HR: 89 (19 Feb 2023 08:51) (84 - 89)  BP: 152/79 (19 Feb 2023 08:51) (146/90 - 154/82)  BP(mean): --  RR: 18 (19 Feb 2023 08:51) (17 - 18)  SpO2: 96% (19 Feb 2023 08:51) (96% - 96%)    Parameters below as of 19 Feb 2023 08:51  Patient On (Oxygen Delivery Method): room air          PHYSICAL EXAMINATION:    NECK:  Supple. No lymphadenopathy. Jugular venous pressure not elevated. Carotids equal.   HEART:   The cardiac impulse has a normal quality. Reg., Nl S1 and S2.  There are no murmurs, rubs or gallops noted  CHEST:  Chest crackles to auscultation. Normal respiratory effort.  ABDOMEN:  Soft and nontender.   EXTREMITIES:  There is no edema.       LABS:                        12.4   6.49  )-----------( 252      ( 18 Feb 2023 07:28 )             37.1     02-18    143  |  113<H>  |  9   ----------------------------<  128<H>  3.9   |  25  |  0.61    Ca    8.7      18 Feb 2023 07:28                
HPI: 73 yo Female with a past medical history of Breast cancer presented with fever x1 day.  Have mild cough but no chest pain or shortness of breath.  She is also endorsing fatigue.  No dysuria or polyuria.  No medications taken prior to arrival.  Denies nausea, vomiting, abdominal pain. No recent sick contact. No recent travel.  (16 Feb 2023 21:44)    Subjective: Patient seen today, doing well, no complaints to offer. Planned for discharge today.     MEDICATIONS  (STANDING):  cefepime   IVPB 2000 milliGRAM(s) IV Intermittent every 12 hours  diazepam    Tablet 10 milliGRAM(s) Oral every 8 hours  heparin   Injectable 5000 Unit(s) SubCutaneous every 8 hours  multivitamin 1 Tablet(s) Oral daily  sodium chloride 0.9%. 1000 milliLiter(s) (100 mL/Hr) IV Continuous <Continuous>  tamoxifen 20 milliGRAM(s) Oral at bedtime    MEDICATIONS  (PRN):  acetaminophen     Tablet .. 650 milliGRAM(s) Oral every 6 hours PRN Temp greater or equal to 38C (100.4F), Mild Pain (1 - 3)  aluminum hydroxide/magnesium hydroxide/simethicone Suspension 30 milliLiter(s) Oral every 4 hours PRN Dyspepsia  melatonin 3 milliGRAM(s) Oral at bedtime PRN Insomnia  ondansetron Injectable 4 milliGRAM(s) IV Push every 8 hours PRN Nausea and/or Vomiting  oxyCODONE    IR 15 milliGRAM(s) Oral four times a day PRN Moderate Pain (4 - 6)      Vital Signs Last 24 Hrs  T(C): 36.9 (19 Feb 2023 08:51), Max: 37 (18 Feb 2023 23:31)  T(F): 98.4 (19 Feb 2023 08:51), Max: 98.6 (18 Feb 2023 23:31)  HR: 89 (19 Feb 2023 08:51) (84 - 89)  BP: 152/79 (19 Feb 2023 08:51) (146/90 - 154/82)  RR: 18 (19 Feb 2023 08:51) (17 - 18)  SpO2: 96% (19 Feb 2023 08:51) (96% - 96%)    Parameters below as of 19 Feb 2023 08:51  Patient On (Oxygen Delivery Method): room air          PHYSICAL EXAM:  GENERAL: NAD, lying in bed comfortably  HEAD:  Atraumatic, Normocephalic  EYES: conjunctiva and sclera clear  ENT: Moist mucous membranes  CHEST/LUNG: Clear to auscultation bilaterally; No rales, rhonchi, wheezing, or rubs. Unlabored respirations  HEART: Regular rate and rhythm; No murmurs, rubs, or gallops  ABDOMEN: Bowel sounds present; Soft, Nontender, Nondistended.   EXTREMITIES:  2+ Peripheral Pulses, brisk capillary refill. No clubbing, cyanosis, or edema  NERVOUS SYSTEM:  Alert & Oriented X3, speech clear. No deficits   MSK: FROM all 4 extremities    LABS:                          12.4   6.49  )-----------( 252      ( 18 Feb 2023 07:28 )             37.1     18 Feb 2023 07:28    143    |  113    |  9      ----------------------------<  128    3.9     |  25     |  0.61     Ca    8.7        18 Feb 2023 07:28          CAPILLARY BLOOD GLUCOSE                RADIOLOGY:        
73 yo Female with a past medical history of Breast cancer presented with fever x1 day.  Have mild cough but no chest pain or shortness of breath.  She is also endorsing fatigue.  No dysuria or polyuria.  No medications taken prior to arrival.  Denies nausea, vomiting, abdominal pain. No recent sick contact. No recent travel.   In the ED- Tmax 100.9 - WBC 16.12- BC And UC sent- IV antibiotics started.     2/17- patient was seen and examined. denies pain, fever or chills- complains that she feels weak.     Vital Signs Last 24 Hrs  T(C): 36.6 (17 Feb 2023 13:39), Max: 38.3 (16 Feb 2023 14:06)  T(F): 97.9 (17 Feb 2023 13:39), Max: 100.9 (16 Feb 2023 14:06)  HR: 99 (17 Feb 2023 13:39) (74 - 99)  BP: 120/73 (17 Feb 2023 13:39) (98/62 - 139/79)  BP(mean): 77 (16 Feb 2023 16:41) (77 - 77)  RR: 18 (17 Feb 2023 13:39) (16 - 19)  SpO2: 96% (17 Feb 2023 13:39) (94% - 98%)    Parameters below as of 17 Feb 2023 13:39  Patient On (Oxygen Delivery Method): room air    ROS:   All 10 systems reviewed and found to be negative with the exception of what has been described above.     PE:  Constitutional: NAD, laying in bed  HEENT: NC/AT  Back: no tenderness  Respiratory: respirations even and non labored, LCTA  Cardiovascular: S1S2 regular, no murmurs  Abdomen: soft, not tender, not distended, positive BS  Genitourinary: voiding  Rectal: deferred  Musculoskeletal: no muscle tenderness, no joint swelling or tenderness  Extremities: no pedal edema   Neurological: no focal deficits    MEDICATIONS  (STANDING):  cefepime   IVPB 2000 milliGRAM(s) IV Intermittent every 12 hours  diazepam    Tablet 10 milliGRAM(s) Oral every 8 hours  heparin   Injectable 5000 Unit(s) SubCutaneous every 8 hours  multivitamin 1 Tablet(s) Oral daily  sodium chloride 0.9%. 1000 milliLiter(s) (100 mL/Hr) IV Continuous <Continuous>  tamoxifen 20 milliGRAM(s) Oral at bedtime    MEDICATIONS  (PRN):  acetaminophen     Tablet .. 650 milliGRAM(s) Oral every 6 hours PRN Temp greater or equal to 38C (100.4F), Mild Pain (1 - 3)  aluminum hydroxide/magnesium hydroxide/simethicone Suspension 30 milliLiter(s) Oral every 4 hours PRN Dyspepsia  melatonin 3 milliGRAM(s) Oral at bedtime PRN Insomnia  ondansetron Injectable 4 milliGRAM(s) IV Push every 8 hours PRN Nausea and/or Vomiting  oxyCODONE    IR 15 milliGRAM(s) Oral four times a day PRN Moderate Pain (4 - 6)      02-17    140  |  112<H>  |  7   ----------------------------<  147<H>  4.1   |  25  |  0.51    Ca    8.4<L>      17 Feb 2023 06:50  Mg     1.8     02-16    TPro  6.3  /  Alb  3.1<L>  /  TBili  0.4  /  DBili  x   /  AST  19  /  ALT  24  /  AlkPhos  42  02-16                          12.0   6.82  )-----------( 228      ( 17 Feb 2023 06:50 )             36.1        CT Angio Chest PE Protocol w/ IV Cont (02.16.23 @ 15:08) >  IMPRESSION:    1.  No pulmonary thromboembolism    2.  Lung nodules measuring up to 6 mm not clearly present on prior study.   Repeat chest CT scan is recommended in 3 months for reassessment given   history of lung cancer.    3.  Marked improvement of peribronchovascular consolidation/density in   the lower lobes. Reassessment for complete resolution can be made on the   above recommended chest CT            
Date of service: 23 @ 12:45    Lying in bed in NAD  Sore throat is improving  No further fever  No chills    ROS: denies dizziness, no HA, no SOB or cough, no abdominal pain, no diarrhea or constipation; no dysuria, no legs pain, no rashes    MEDICATIONS  (STANDING):  cefepime   IVPB 2000 milliGRAM(s) IV Intermittent every 12 hours  diazepam    Tablet 10 milliGRAM(s) Oral every 8 hours  heparin   Injectable 5000 Unit(s) SubCutaneous every 8 hours  multivitamin 1 Tablet(s) Oral daily  sodium chloride 0.9%. 1000 milliLiter(s) (100 mL/Hr) IV Continuous <Continuous>  tamoxifen 20 milliGRAM(s) Oral at bedtime    Vital Signs Last 24 Hrs  T(C): 36.6 (2023 08:09), Max: 37.2 (2023 20:34)  T(F): 97.9 (2023 08:09), Max: 99 (2023 20:34)  HR: 83 (2023 11:40) (83 - 108)  BP: 125/81 (2023 11:40) (111/89 - 146/86)  BP(mean): --  RR: 18 (2023 11:40) (18 - 18)  SpO2: 96% (2023 11:40) (95% - 96%)    Parameters below as of 2023 11:40  Patient On (Oxygen Delivery Method): room air     Physical exam:    Constitutional:  No acute distress  HEENT: NC/AT, EOMI, PERRLA, conjunctivae clear; ears and nose atraumatic  Neck: supple; thyroid not palpable  Back: no tenderness  Respiratory: respiratory effort normal; clear to auscultation  Cardiovascular: S1S2 regular, no murmurs  Abdomen: soft, not tender, not distended, positive BS  Genitourinary: no suprapubic tenderness  Lymphatic: no LN palpable  Musculoskeletal: no muscle tenderness, no joint swelling or tenderness  Extremities: no pedal edema  Neurological/ Psychiatric: AxOx3, moving all extremities  Skin: no rashes; no palpable lesions    Labs: reviewed                        12.4   6.49  )-----------( 252      ( 2023 07:28 )             37.1     02-18    143  |  113<H>  |  9   ----------------------------<  128<H>  3.9   |  25  |  0.61    Ca    8.7      2023 07:28  Mg     1.8     02-16    TPro  6.3  /  Alb  3.1<L>  /  TBili  0.4  /  DBili  x   /  AST  19  /  ALT  24  /  AlkPhos  42  02-16    D-Dimer Assay, Quantitative: 248 ng/mL DDU (23 @ 14:21)                        12.0   6.82  )-----------( 228      ( 2023 06:50 )             36.1     02-    140  |  112<H>  |  7   ----------------------------<  147<H>  4.1   |  25  |  0.51    Ca    8.4<L>      2023 06:50  Mg     1.8     -16    TPro  6.3  /  Alb  3.1<L>  /  TBili  0.4  /  DBili  x   /  AST  19  /  ALT  24  /  AlkPhos  42  02-16     LIVER FUNCTIONS - ( 2023 14:21 )  Alb: 3.1 g/dL / Pro: 6.3 gm/dL / ALK PHOS: 42 U/L / ALT: 24 U/L / AST: 19 U/L / GGT: x           Urinalysis Basic - ( 2023 16:28 )    Color: Yellow / Appearance: Clear / S.010 / pH: x  Gluc: x / Ketone: Negative  / Bili: Negative / Urobili: Negative   Blood: x / Protein: Negative / Nitrite: Negative   Leuk Esterase: Negative / RBC: x / WBC x   Sq Epi: x / Non Sq Epi: x / Bacteria: x    ( @ 14:21)  NotDetec      Culture - Urine (collected 2023 16:28)  Source: Clean Catch Clean Catch (Midstream)  Final Report (2023 07:50):    <10,000 CFU/mL Normal Urogenital Yessica    Culture - Blood (collected 2023 14:21)  Source: .Blood None  Preliminary Report (2023 19:02):    No growth to date.    Culture - Blood (collected 2023 14:21)  Source: .Blood None  Preliminary Report (2023 19:02):    No growth to date.    Radiology: all available radiological tests reviewed    < from: CT Angio Chest PE Protocol w/ IV Cont (23 @ 15:08) >  1.  No pulmonary thromboembolism  2.  Lung nodules measuring up to 6 mm not clearly present on prior study.   Repeat chest CT scan is recommended in 3 months for reassessment given history of lung cancer.  3.  Marked improvement of peribronchovascular consolidation/density in   the lower lobes. Reassessment for complete resolution can be made on the above recommended chest CT  < end of copied text >    Advanced directives addressed: full resuscitation
HPI: 71 yo Female with a past medical history of Breast cancer presented with fever x1 day.  Have mild cough but no chest pain or shortness of breath.  She is also endorsing fatigue.  No dysuria or polyuria.  No medications taken prior to arrival.  Denies nausea, vomiting, abdominal pain. No recent sick contact. No recent travel.  (16 Feb 2023 21:44)    Subjective: Patient seen today, doing well, no complaints to offer. No further sore throat. Remains afebrile.     MEDICATIONS  (STANDING):  cefepime   IVPB 2000 milliGRAM(s) IV Intermittent every 12 hours  diazepam    Tablet 10 milliGRAM(s) Oral every 8 hours  heparin   Injectable 5000 Unit(s) SubCutaneous every 8 hours  multivitamin 1 Tablet(s) Oral daily  sodium chloride 0.9%. 1000 milliLiter(s) (100 mL/Hr) IV Continuous <Continuous>  tamoxifen 20 milliGRAM(s) Oral at bedtime    MEDICATIONS  (PRN):  acetaminophen     Tablet .. 650 milliGRAM(s) Oral every 6 hours PRN Temp greater or equal to 38C (100.4F), Mild Pain (1 - 3)  aluminum hydroxide/magnesium hydroxide/simethicone Suspension 30 milliLiter(s) Oral every 4 hours PRN Dyspepsia  melatonin 3 milliGRAM(s) Oral at bedtime PRN Insomnia  ondansetron Injectable 4 milliGRAM(s) IV Push every 8 hours PRN Nausea and/or Vomiting  oxyCODONE    IR 15 milliGRAM(s) Oral four times a day PRN Moderate Pain (4 - 6)      Vital Signs Last 24 Hrs  T(C): 36.6 (18 Feb 2023 08:09), Max: 37.2 (17 Feb 2023 20:34)  T(F): 97.9 (18 Feb 2023 08:09), Max: 99 (17 Feb 2023 20:34)  HR: 83 (18 Feb 2023 11:40) (83 - 92)  BP: 125/81 (18 Feb 2023 11:40) (111/89 - 146/86)  RR: 18 (18 Feb 2023 11:40) (18 - 18)  SpO2: 96% (18 Feb 2023 11:40) (95% - 96%)    Parameters below as of 18 Feb 2023 11:40  Patient On (Oxygen Delivery Method): room air      PHYSICAL EXAM:  GENERAL: NAD, lying in bed comfortably  HEAD:  Atraumatic, Normocephalic  EYES: conjunctiva and sclera clear  ENT: Moist mucous membranes  CHEST/LUNG: Clear to auscultation bilaterally; No rales, rhonchi, wheezing, or rubs. Unlabored respirations  HEART: Regular rate and rhythm; No murmurs, rubs, or gallops  ABDOMEN: Bowel sounds present; Soft, Nontender, Nondistended.   EXTREMITIES:  2+ Peripheral Pulses, brisk capillary refill. No clubbing, cyanosis, or edema  NERVOUS SYSTEM:  Alert & Oriented X3, speech clear. No deficits   MSK: FROM all 4 extremities    LABS:                          12.4   6.49  )-----------( 252      ( 18 Feb 2023 07:28 )             37.1     18 Feb 2023 07:28    143    |  113    |  9      ----------------------------<  128    3.9     |  25     |  0.61     Ca    8.7        18 Feb 2023 07:28          CAPILLARY BLOOD GLUCOSE                RADIOLOGY:        
Subjective:    pat better, sitting in bed, no new complaint,  @ bedside.    Home Medications:  alendronate 70 mg oral tablet: 1 tab(s) orally once a week on Monday  (2023 16:15)  diazePAM 10 mg oral tablet: 1 tab(s) orally every 8 hours (2023 16:15)  meloxicam 7.5 mg oral tablet: 1 tab(s) orally once a day (2023 16:15)  Multiple Vitamins oral tablet: 1 tab(s) orally once a day (2023 16:40)  oxyCODONE 15 mg oral tablet: 1 tab(s) orally 5 times a day, As Needed for pain (2023 16:15)  tamoxifen 20 mg oral tablet: 1 tab(s) orally once a day (at bedtime) (2023 16:15)  Vitamin D3 1250 mcg (50,000 intl units) oral capsule: 1 cap(s) orally once a week on Monday (2023 16:15)    MEDICATIONS  (STANDING):  cefepime   IVPB 2000 milliGRAM(s) IV Intermittent every 12 hours  diazepam    Tablet 10 milliGRAM(s) Oral every 8 hours  heparin   Injectable 5000 Unit(s) SubCutaneous every 8 hours  multivitamin 1 Tablet(s) Oral daily  sodium chloride 0.9%. 1000 milliLiter(s) (100 mL/Hr) IV Continuous <Continuous>  tamoxifen 20 milliGRAM(s) Oral at bedtime    MEDICATIONS  (PRN):  acetaminophen     Tablet .. 650 milliGRAM(s) Oral every 6 hours PRN Temp greater or equal to 38C (100.4F), Mild Pain (1 - 3)  aluminum hydroxide/magnesium hydroxide/simethicone Suspension 30 milliLiter(s) Oral every 4 hours PRN Dyspepsia  melatonin 3 milliGRAM(s) Oral at bedtime PRN Insomnia  ondansetron Injectable 4 milliGRAM(s) IV Push every 8 hours PRN Nausea and/or Vomiting  oxyCODONE    IR 15 milliGRAM(s) Oral four times a day PRN Moderate Pain (4 - 6)      Allergies    No Known Allergies    Intolerances        Vital Signs Last 24 Hrs  T(C): 36.6 (2023 08:09), Max: 37.2 (2023 20:34)  T(F): 97.9 (2023 08:09), Max: 99 (2023 20:34)  HR: 83 (2023 11:40) (83 - 108)  BP: 125/81 (2023 11:40) (111/89 - 146/86)  BP(mean): --  RR: 18 (2023 11:40) (18 - 18)  SpO2: 96% (2023 11:40) (95% - 96%)    Parameters below as of 2023 11:40  Patient On (Oxygen Delivery Method): room air          PHYSICAL EXAMINATION:    NECK:  Supple. No lymphadenopathy. Jugular venous pressure not elevated. Carotids equal.   HEART:   The cardiac impulse has a normal quality. Reg., Nl S1 and S2.  There are no murmurs, rubs or gallops noted  CHEST:  Chest crackles to auscultation. Normal respiratory effort.  ABDOMEN:  Soft and nontender.   EXTREMITIES:  There is no edema.       LABS:                        12.4   6.49  )-----------( 252      ( 2023 07:28 )             37.1     02-18    143  |  113<H>  |  9   ----------------------------<  128<H>  3.9   |  25  |  0.61    Ca    8.7      2023 07:28  Mg     1.8     02-16    TPro  6.3  /  Alb  3.1<L>  /  TBili  0.4  /  DBili  x   /  AST  19  /  ALT  24  /  AlkPhos  42  02-16      Urinalysis Basic - ( 2023 16:28 )    Color: Yellow / Appearance: Clear / S.010 / pH: x  Gluc: x / Ketone: Negative  / Bili: Negative / Urobili: Negative   Blood: x / Protein: Negative / Nitrite: Negative   Leuk Esterase: Negative / RBC: x / WBC x   Sq Epi: x / Non Sq Epi: x / Bacteria: x

## 2023-02-23 DIAGNOSIS — Z79.810 LONG TERM (CURRENT) USE OF SELECTIVE ESTROGEN RECEPTOR MODULATORS (SERMS): ICD-10-CM

## 2023-02-23 DIAGNOSIS — Z20.822 CONTACT WITH AND (SUSPECTED) EXPOSURE TO COVID-19: ICD-10-CM

## 2023-02-23 DIAGNOSIS — A41.9 SEPSIS, UNSPECIFIED ORGANISM: ICD-10-CM

## 2023-02-23 DIAGNOSIS — E87.1 HYPO-OSMOLALITY AND HYPONATREMIA: ICD-10-CM

## 2023-02-23 DIAGNOSIS — R91.8 OTHER NONSPECIFIC ABNORMAL FINDING OF LUNG FIELD: ICD-10-CM

## 2023-02-23 DIAGNOSIS — R50.9 FEVER, UNSPECIFIED: ICD-10-CM

## 2023-02-23 DIAGNOSIS — Z85.3 PERSONAL HISTORY OF MALIGNANT NEOPLASM OF BREAST: ICD-10-CM

## 2023-02-23 DIAGNOSIS — J02.9 ACUTE PHARYNGITIS, UNSPECIFIED: ICD-10-CM

## 2023-07-20 NOTE — ED ADULT NURSE NOTE - NS ED NURSE LEVEL OF CONSCIOUSNESS AFFECT
[facility] Complex Repair And Ftsg Text: The defect edges were debeveled with a #15 scalpel blade.  The primary defect was closed partially with a complex linear closure.  Given the location of the defect, shape of the defect and the proximity to free margins a full thickness skin graft was deemed most appropriate to repair the remaining defect.  The graft was trimmed to fit the size of the remaining defect.  The graft was then placed in the primary defect, oriented appropriately, and sutured into place.

## 2023-09-27 ENCOUNTER — INPATIENT (INPATIENT)
Facility: HOSPITAL | Age: 73
LOS: 1 days | Discharge: HOME | DRG: 82 | End: 2023-09-29
Attending: FAMILY MEDICINE | Admitting: INTERNAL MEDICINE
Payer: MEDICARE

## 2023-09-27 VITALS
OXYGEN SATURATION: 96 % | DIASTOLIC BLOOD PRESSURE: 68 MMHG | TEMPERATURE: 98 F | SYSTOLIC BLOOD PRESSURE: 98 MMHG | HEART RATE: 104 BPM | RESPIRATION RATE: 18 BRPM

## 2023-09-27 DIAGNOSIS — J96.01 ACUTE RESPIRATORY FAILURE WITH HYPOXIA: ICD-10-CM

## 2023-09-27 DIAGNOSIS — Z98.890 OTHER SPECIFIED POSTPROCEDURAL STATES: Chronic | ICD-10-CM

## 2023-09-27 DIAGNOSIS — Z90.710 ACQUIRED ABSENCE OF BOTH CERVIX AND UTERUS: Chronic | ICD-10-CM

## 2023-09-27 LAB
ALBUMIN SERPL ELPH-MCNC: 3.5 G/DL — SIGNIFICANT CHANGE UP (ref 3.3–5)
ALP SERPL-CCNC: 60 U/L — SIGNIFICANT CHANGE UP (ref 40–120)
ALT FLD-CCNC: 42 U/L — SIGNIFICANT CHANGE UP (ref 12–78)
ANION GAP SERPL CALC-SCNC: 5 MMOL/L — SIGNIFICANT CHANGE UP (ref 5–17)
APTT BLD: 27 SEC — SIGNIFICANT CHANGE UP (ref 24.5–35.6)
AST SERPL-CCNC: 19 U/L — SIGNIFICANT CHANGE UP (ref 15–37)
BASOPHILS # BLD AUTO: 0.03 K/UL — SIGNIFICANT CHANGE UP (ref 0–0.2)
BASOPHILS NFR BLD AUTO: 0.2 % — SIGNIFICANT CHANGE UP (ref 0–2)
BILIRUB SERPL-MCNC: 0.3 MG/DL — SIGNIFICANT CHANGE UP (ref 0.2–1.2)
BUN SERPL-MCNC: 9 MG/DL — SIGNIFICANT CHANGE UP (ref 7–23)
CALCIUM SERPL-MCNC: 8.7 MG/DL — SIGNIFICANT CHANGE UP (ref 8.5–10.1)
CHLORIDE SERPL-SCNC: 100 MMOL/L — SIGNIFICANT CHANGE UP (ref 96–108)
CO2 SERPL-SCNC: 27 MMOL/L — SIGNIFICANT CHANGE UP (ref 22–31)
CREAT SERPL-MCNC: 0.81 MG/DL — SIGNIFICANT CHANGE UP (ref 0.5–1.3)
EGFR: 78 ML/MIN/1.73M2 — SIGNIFICANT CHANGE UP
EOSINOPHIL # BLD AUTO: 0 K/UL — SIGNIFICANT CHANGE UP (ref 0–0.5)
EOSINOPHIL NFR BLD AUTO: 0 % — SIGNIFICANT CHANGE UP (ref 0–6)
GLUCOSE SERPL-MCNC: 146 MG/DL — HIGH (ref 70–99)
HCT VFR BLD CALC: 43.6 % — SIGNIFICANT CHANGE UP (ref 34.5–45)
HGB BLD-MCNC: 14.1 G/DL — SIGNIFICANT CHANGE UP (ref 11.5–15.5)
IMM GRANULOCYTES NFR BLD AUTO: 0.7 % — SIGNIFICANT CHANGE UP (ref 0–0.9)
INR BLD: 1.05 RATIO — SIGNIFICANT CHANGE UP (ref 0.85–1.18)
LACTATE SERPL-SCNC: 1.7 MMOL/L — SIGNIFICANT CHANGE UP (ref 0.7–2)
LACTATE SERPL-SCNC: 3.2 MMOL/L — HIGH (ref 0.7–2)
LYMPHOCYTES # BLD AUTO: 1 K/UL — SIGNIFICANT CHANGE UP (ref 1–3.3)
LYMPHOCYTES # BLD AUTO: 8.3 % — LOW (ref 13–44)
MCHC RBC-ENTMCNC: 30.2 PG — SIGNIFICANT CHANGE UP (ref 27–34)
MCHC RBC-ENTMCNC: 32.3 GM/DL — SIGNIFICANT CHANGE UP (ref 32–36)
MCV RBC AUTO: 93.4 FL — SIGNIFICANT CHANGE UP (ref 80–100)
MONOCYTES # BLD AUTO: 1.48 K/UL — HIGH (ref 0–0.9)
MONOCYTES NFR BLD AUTO: 12.3 % — SIGNIFICANT CHANGE UP (ref 2–14)
NEUTROPHILS # BLD AUTO: 9.43 K/UL — HIGH (ref 1.8–7.4)
NEUTROPHILS NFR BLD AUTO: 78.5 % — HIGH (ref 43–77)
PLATELET # BLD AUTO: 302 K/UL — SIGNIFICANT CHANGE UP (ref 150–400)
POTASSIUM SERPL-MCNC: 4.5 MMOL/L — SIGNIFICANT CHANGE UP (ref 3.5–5.3)
POTASSIUM SERPL-SCNC: 4.5 MMOL/L — SIGNIFICANT CHANGE UP (ref 3.5–5.3)
PROT SERPL-MCNC: 7.3 GM/DL — SIGNIFICANT CHANGE UP (ref 6–8.3)
PROTHROM AB SERPL-ACNC: 11.9 SEC — SIGNIFICANT CHANGE UP (ref 9.5–13)
RAPID RVP RESULT: SIGNIFICANT CHANGE UP
RBC # BLD: 4.67 M/UL — SIGNIFICANT CHANGE UP (ref 3.8–5.2)
RBC # FLD: 13.3 % — SIGNIFICANT CHANGE UP (ref 10.3–14.5)
SARS-COV-2 RNA SPEC QL NAA+PROBE: SIGNIFICANT CHANGE UP
SODIUM SERPL-SCNC: 132 MMOL/L — LOW (ref 135–145)
TROPONIN I, HIGH SENSITIVITY RESULT: 24.49 NG/L — SIGNIFICANT CHANGE UP
TROPONIN I, HIGH SENSITIVITY RESULT: 27.35 NG/L — SIGNIFICANT CHANGE UP
WBC # BLD: 12.02 K/UL — HIGH (ref 3.8–10.5)
WBC # FLD AUTO: 12.02 K/UL — HIGH (ref 3.8–10.5)

## 2023-09-27 PROCEDURE — 87086 URINE CULTURE/COLONY COUNT: CPT

## 2023-09-27 PROCEDURE — 80048 BASIC METABOLIC PNL TOTAL CA: CPT

## 2023-09-27 PROCEDURE — 97530 THERAPEUTIC ACTIVITIES: CPT | Mod: GP

## 2023-09-27 PROCEDURE — 74176 CT ABD & PELVIS W/O CONTRAST: CPT | Mod: 26,MD

## 2023-09-27 PROCEDURE — 97162 PT EVAL MOD COMPLEX 30 MIN: CPT | Mod: GP

## 2023-09-27 PROCEDURE — 99285 EMERGENCY DEPT VISIT HI MDM: CPT

## 2023-09-27 PROCEDURE — 99222 1ST HOSP IP/OBS MODERATE 55: CPT

## 2023-09-27 PROCEDURE — 72125 CT NECK SPINE W/O DYE: CPT | Mod: 26,MD

## 2023-09-27 PROCEDURE — 99497 ADVNCD CARE PLAN 30 MIN: CPT | Mod: 25

## 2023-09-27 PROCEDURE — 93010 ELECTROCARDIOGRAM REPORT: CPT

## 2023-09-27 PROCEDURE — 97116 GAIT TRAINING THERAPY: CPT | Mod: GP

## 2023-09-27 PROCEDURE — 87040 BLOOD CULTURE FOR BACTERIA: CPT

## 2023-09-27 PROCEDURE — 81001 URINALYSIS AUTO W/SCOPE: CPT

## 2023-09-27 PROCEDURE — 70450 CT HEAD/BRAIN W/O DYE: CPT | Mod: 26,MD

## 2023-09-27 PROCEDURE — 84484 ASSAY OF TROPONIN QUANT: CPT

## 2023-09-27 PROCEDURE — 83605 ASSAY OF LACTIC ACID: CPT

## 2023-09-27 PROCEDURE — 36415 COLL VENOUS BLD VENIPUNCTURE: CPT

## 2023-09-27 PROCEDURE — 71250 CT THORAX DX C-: CPT | Mod: 26,MD

## 2023-09-27 PROCEDURE — 85027 COMPLETE CBC AUTOMATED: CPT

## 2023-09-27 RX ORDER — LANOLIN ALCOHOL/MO/W.PET/CERES
3 CREAM (GRAM) TOPICAL AT BEDTIME
Refills: 0 | Status: DISCONTINUED | OUTPATIENT
Start: 2023-09-27 | End: 2023-09-29

## 2023-09-27 RX ORDER — OXYCODONE HYDROCHLORIDE 5 MG/1
1 TABLET ORAL
Refills: 0 | DISCHARGE

## 2023-09-27 RX ORDER — ONDANSETRON 8 MG/1
4 TABLET, FILM COATED ORAL EVERY 8 HOURS
Refills: 0 | Status: DISCONTINUED | OUTPATIENT
Start: 2023-09-27 | End: 2023-09-29

## 2023-09-27 RX ORDER — ICOSAPENT ETHYL 500 MG/1
1 CAPSULE, LIQUID FILLED ORAL
Refills: 0 | DISCHARGE

## 2023-09-27 RX ORDER — SODIUM CHLORIDE 9 MG/ML
1000 INJECTION INTRAMUSCULAR; INTRAVENOUS; SUBCUTANEOUS
Refills: 0 | Status: DISCONTINUED | OUTPATIENT
Start: 2023-09-27 | End: 2023-09-29

## 2023-09-27 RX ORDER — ALENDRONATE SODIUM 70 MG/1
1 TABLET ORAL
Qty: 0 | Refills: 0 | DISCHARGE

## 2023-09-27 RX ORDER — OXYCODONE HYDROCHLORIDE 5 MG/1
15 TABLET ORAL THREE TIMES A DAY
Refills: 0 | Status: DISCONTINUED | OUTPATIENT
Start: 2023-09-27 | End: 2023-09-29

## 2023-09-27 RX ORDER — DIAZEPAM 5 MG
10 TABLET ORAL
Refills: 0 | Status: DISCONTINUED | OUTPATIENT
Start: 2023-09-27 | End: 2023-09-29

## 2023-09-27 RX ORDER — ACETAMINOPHEN 500 MG
650 TABLET ORAL EVERY 6 HOURS
Refills: 0 | Status: DISCONTINUED | OUTPATIENT
Start: 2023-09-27 | End: 2023-09-29

## 2023-09-27 RX ORDER — CHOLECALCIFEROL (VITAMIN D3) 125 MCG
1 CAPSULE ORAL
Qty: 0 | Refills: 0 | DISCHARGE

## 2023-09-27 RX ORDER — ANASTROZOLE 1 MG/1
1 TABLET ORAL
Refills: 0 | DISCHARGE

## 2023-09-27 RX ORDER — DIAZEPAM 5 MG
1 TABLET ORAL
Qty: 0 | Refills: 0 | DISCHARGE

## 2023-09-27 RX ORDER — MELOXICAM 15 MG/1
1 TABLET ORAL
Qty: 0 | Refills: 0 | DISCHARGE

## 2023-09-27 RX ORDER — CELECOXIB 200 MG/1
200 CAPSULE ORAL DAILY
Refills: 0 | Status: DISCONTINUED | OUTPATIENT
Start: 2023-09-27 | End: 2023-09-29

## 2023-09-27 RX ORDER — TAMOXIFEN CITRATE 20 MG/1
1 TABLET, FILM COATED ORAL
Qty: 0 | Refills: 0 | DISCHARGE

## 2023-09-27 RX ORDER — HEPARIN SODIUM 5000 [USP'U]/ML
5000 INJECTION INTRAVENOUS; SUBCUTANEOUS EVERY 12 HOURS
Refills: 0 | Status: DISCONTINUED | OUTPATIENT
Start: 2023-09-27 | End: 2023-09-29

## 2023-09-27 RX ORDER — OXYCODONE HYDROCHLORIDE 5 MG/1
1 TABLET ORAL
Qty: 0 | Refills: 0 | DISCHARGE

## 2023-09-27 RX ORDER — SODIUM CHLORIDE 9 MG/ML
1000 INJECTION INTRAMUSCULAR; INTRAVENOUS; SUBCUTANEOUS ONCE
Refills: 0 | Status: COMPLETED | OUTPATIENT
Start: 2023-09-27 | End: 2023-09-27

## 2023-09-27 RX ADMIN — HEPARIN SODIUM 5000 UNIT(S): 5000 INJECTION INTRAVENOUS; SUBCUTANEOUS at 21:44

## 2023-09-27 RX ADMIN — OXYCODONE HYDROCHLORIDE 15 MILLIGRAM(S): 5 TABLET ORAL at 23:18

## 2023-09-27 RX ADMIN — SODIUM CHLORIDE 1000 MILLILITER(S): 9 INJECTION INTRAMUSCULAR; INTRAVENOUS; SUBCUTANEOUS at 05:41

## 2023-09-27 RX ADMIN — OXYCODONE HYDROCHLORIDE 15 MILLIGRAM(S): 5 TABLET ORAL at 15:52

## 2023-09-27 RX ADMIN — OXYCODONE HYDROCHLORIDE 15 MILLIGRAM(S): 5 TABLET ORAL at 23:48

## 2023-09-27 RX ADMIN — Medication 10 MILLIGRAM(S): at 21:44

## 2023-09-27 RX ADMIN — SODIUM CHLORIDE 75 MILLILITER(S): 9 INJECTION INTRAMUSCULAR; INTRAVENOUS; SUBCUTANEOUS at 13:09

## 2023-09-27 NOTE — ED ADULT NURSE NOTE - CADM POA PRESS ULCER
[facility]    NAME: Leroy Brunson is a 72 y o  female  : 1949    MRN: [id number]  DATE: 2022  TIME: 9:08 AM    Assessment and Plan   Urinary tract infection without hematuria, site unspecified [N39 0]  1  Urinary tract infection without hematuria, site unspecified  Urine culture    POCT urine dip    nitrofurantoin (MACROBID) 100 mg capsule       Patient Instructions     Patient Instructions   I have prescribed an antibiotic for the infection  Please take the antibiotic as prescribed and finish the entire prescription  I recommend that the patient takes an over the counter probiotic or eats yogurt with live cultures in it [place]) to keep good bacteria in the gut and help prevent diarrhea  If not improving over the next 7-10 days, follow up with PCP  Go to the emergency department if:   You have severe back, side, or abdominal pain  You have fever and shaking chills  You vomit several times in a row  Contact your primary care provider if:   Your symptoms do not go away, even after treatment  Drink more liquids  Liquids help flush out bacteria that may be causing an infection  Chief Complaint     Chief Complaint   Patient presents with    Possible UTI     Pt c/o dysuria since yesterday  History of Present Illness   72year old female here with complaint of urinary frequency and burning for the past day  No fever, chills  No back pain  Does have some suprapubic discomfort  Review of Systems   Review of Systems   Constitutional: Negative for activity change, appetite change, chills, fatigue and fever  Respiratory: Negative for cough  Cardiovascular: Negative for chest pain  Gastrointestinal: Negative for abdominal pain, constipation, diarrhea, nausea and vomiting  Genitourinary: Positive for dysuria and frequency  Negative for difficulty urinating, flank pain, hematuria and urgency     Musculoskeletal: Negative for back pain and myalgias  All other systems reviewed and are negative        Current Medications     Current Outpatient Medications:     amitriptyline (ELAVIL) 50 mg tablet, Take 1 tablet (50 mg total) by mouth daily at bedtime, Disp: 90 tablet, Rfl: 3    aspirin (ECOTRIN) 325 mg EC tablet, Take 1 tablet (325 mg total) by mouth 2 (two) times a day (Patient taking differently: Take 325 mg by mouth daily ), Disp: 60 tablet, Rfl: 0    Biotin 2500 MCG CAPS, Take 1 capsule by mouth 2 (two) times a day , Disp: , Rfl:     calcium carbonate (OS-ANTHONY) 600 MG tablet, Take 1,200 mg by mouth 2 (two) times a day with meals, Disp: , Rfl:     Cholecalciferol (VITAMIN D-3 PO), Take 1 tablet by mouth daily, Disp: , Rfl:     clobetasol (TEMOVATE) 0 05 % cream, Apply topically 2 (two) times a day, Disp: 30 g, Rfl: 5    Cyanocobalamin (VITAMIN B-12 CR PO), Take 1 tablet by mouth daily, Disp: , Rfl:     Diclofenac Sodium (VOLTAREN) 1 %, Apply 2 g topically 4 (four) times a day, Disp: 150 g, Rfl: 1    dicyclomine (BENTYL) 10 mg capsule, Take 1 capsule (10 mg total) by mouth 2 (two) times a day, Disp: 180 capsule, Rfl: 1    EPINEPHrine (EpiPen 2-Michel) 0 3 mg/0 3 mL SOAJ, Inject 0 3 mL (0 3 mg total) into a muscle as needed for anaphylaxis, Disp: 2 each, Rfl: 5    fexofenadine (ALLEGRA) 180 MG tablet, Take 180 mg by mouth daily, Disp: , Rfl:     gabapentin (NEURONTIN) 300 mg capsule, Take 1 tablet in the morning, 1 tablet afternoon and 2 bedtime, Disp: 360 capsule, Rfl: 0    hydrOXYzine HCL (ATARAX) 10 mg tablet, Take 2 tablets (20 mg total) by mouth daily at bedtime Take 2 tablets at bedtime, Disp: 180 tablet, Rfl: 1    losartan (COZAAR) 100 MG tablet, Take 1 tablet (100 mg total) by mouth daily, Disp: 90 tablet, Rfl: 0    methocarbamol (ROBAXIN) 750 mg tablet, Take 1 tablet (750 mg total) by mouth every 8 (eight) hours, Disp: 270 tablet, Rfl: 0    montelukast (SINGULAIR) 10 mg tablet, Take 1 tablet (10 mg total) by mouth daily at bedtime, Disp: 90 tablet, Rfl: 3    nitrofurantoin (MACROBID) 100 mg capsule, Take 1 capsule (100 mg total) by mouth 2 (two) times a day for 7 days, Disp: 14 capsule, Rfl: 0    Omega-3 Fatty Acids (FISH OIL) 1,000 mg, Take 1,000 mg by mouth 3 (three) times a day, Disp: , Rfl:     omeprazole (PriLOSEC) 40 MG capsule, Take 1 capsule (40 mg total) by mouth daily, Disp: 90 capsule, Rfl: 3    rosuvastatin (CRESTOR) 10 MG tablet, Take 1 tablet (10 mg total) by mouth every other day, Disp: 45 tablet, Rfl: 3    rosuvastatin (CRESTOR) 10 MG tablet, Take 10 mg by mouth every other day, Disp: , Rfl:     simvastatin (ZOCOR) 10 mg tablet, Take 1 tablet (10 mg total) by mouth daily at bedtime, Disp: 90 tablet, Rfl: 2    Current Allergies     Allergies as of 02/06/2022 - Reviewed 02/06/2022   Allergen Reaction Noted    Bee venom Shortness Of Breath 08/11/2017    Chlorhexidine Rash 09/04/2020    Egg yolk - food allergy Hives 11/07/2013    Iodinated diagnostic agents Hives 04/02/2016    Penicillins Hives 04/02/2016    Lidocaine Other (See Comments) 10/14/2021    Bactrim [sulfamethoxazole-trimethoprim] Rash 03/08/2019    Codeine Other (See Comments) 04/02/2016    Latex Rash 12/08/2016    Medical tape Blisters, Hives, Itching, and Rash 05/12/2021    Other Rash 04/13/2017    Oxybutynin Rash 04/22/2019    Pentosan polysulfate Rash 11/05/2018    Povidone iodine Rash 09/29/2020          The following portions of the patient's history were reviewed and updated as appropriate: allergies, current medications, past family history, past medical history, past social history, past surgical history and problem list    Past Medical History:   Diagnosis Date    Abnormal findings on diagnostic imaging of breast     Abnormal Pap smear of cervix     Arthritis     Chronic pain disorder     Extremity pain     Fibrocystic breast disease     Foot pain     GERD (gastroesophageal reflux disease)     Hyperlipidemia     Hypertension     Interstitial cystitis     Joint pain     Low back pain     Osteoarthritis     Osteopenia      Past Surgical History:   Procedure Laterality Date    APPENDECTOMY      BACK SURGERY      BREAST EXCISIONAL BIOPSY Left 1996    benign    CARPAL BOSS EXCISION      CHOLECYSTECTOMY      DIAGNOSTIC LAPAROSCOPY      FOOT SURGERY      FRACTURE SURGERY      HYSTERECTOMY      age 34    HYSTEROSCOPY      JOINT REPLACEMENT      KIDNEY SURGERY Left     KNEE ARTHROSCOPY Bilateral     LAMINECTOMY      LAPAROSCOPY      hynecologic with adhesions    MYOMECTOMY      OOPHORECTOMY Bilateral     age 34    ORTHOPEDIC SURGERY      GA SURG IMPLNT [address] N/A 5/18/2017    Procedure: PLACEMENT OF THORACIC SPINAL CORD STIMULATOR WITH LEFT BUTTOCK IMPLANTABLE PULSE GENERATOR (IMPULSE MONITORING-MOTORS (TCEMEP), EMG, SSEP);   Surgeon: Deana Jeff MD;  Location: [facility];  Service: Neurosurgery    SPINAL CORD STIMULATOR IMPLANT Left 9/29/2020    Procedure: LAMINECTOMY THORACIC , REMOVAL OF SCS LEADS AND GENERATOR;  Surgeon: Caron Go MD;  Location:  MAIN OR;  Service: Neurosurgery    SPINAL STIMULATOR PLACEMENT  05/2017    TONSILLECTOMY AND ADENOIDECTOMY      onset: unknown    TOTAL KNEE ARTHROPLASTY Right      Family History   Problem Relation Age of Onset    Bone cancer Mother     Cancer Mother     Asthma Mother     Brain cancer Father     Stroke Father         stroke sydrome    Transient ischemic attack Father     Diabetes Paternal Grandmother     Breast cancer Paternal Grandmother 61    Breast cancer Maternal Aunt 79    Breast cancer additional onset Maternal Aunt 67    Depression Sister     Migraines Sister     Asthma Sister     No Known Problems Maternal Grandmother     No Known Problems Maternal Grandfather     No Known Problems Paternal Grandfather     Asthma Sister     Heart disease Brother     Diabetes Brother     Heart attack Brother     Colon cancer Brother 77    Diabetes Sister      Social History     Socioeconomic History    Marital status: Single     Spouse name: Not on file    Number of children: Not on file    Years of education: Not on file    Highest education level: Not on file   Occupational History    Occupation: Retired/ working part-time    Tobacco Use    Smoking status: Never Smoker    Smokeless tobacco: Never Used   Vaping Use    Vaping Use: Never used   Substance and Sexual Activity    Alcohol use: Yes     Alcohol/week: 0 0 standard drinks     Comment: Drink socially, not too often    Drug use: No    Sexual activity: Not Currently     Partners: Male     Birth control/protection: None   Other Topics Concern    Not on file   Social History Narrative    No caffeine use    Always uses sunscreen    Always uses a seatbelt     Social Determinants of Health     Financial Resource Strain: Not on file   Food Insecurity: Not on file   Transportation Needs: Not on file   Physical Activity: Not on file   Stress: Not on file   Social Connections: Not on file   Intimate Partner Violence: Not on file   Housing Stability: Not on file     Medications have been verified  Objective   /76   Pulse 86   Temp 97 6 °F (36 4 °C)   Resp 18   LMP  (LMP Unknown)   SpO2 96%      Physical Exam   Physical Exam  Vitals and nursing note reviewed  Constitutional:       General: She is not in acute distress  Appearance: Normal appearance  She is well-developed  HENT:      Head: Normocephalic and atraumatic  Cardiovascular:      Rate and Rhythm: Normal rate and regular rhythm  Heart sounds: Normal heart sounds  No murmur heard  Pulmonary:      Effort: Pulmonary effort is normal  No respiratory distress  Breath sounds: Normal breath sounds  Abdominal:      General: Bowel sounds are normal       Tenderness: There is no abdominal tenderness  There is no right CVA tenderness or left CVA tenderness  No

## 2023-09-27 NOTE — ED PROVIDER NOTE - CPE EDP ENMT NORM
Watch the hand closely.  If there is any worsening swelling, redness, red streaks up the arm or fever please return to emergency department to receive IV antibiotics.  
normal...

## 2023-09-27 NOTE — PATIENT PROFILE ADULT - FALL HARM RISK - HARM RISK INTERVENTIONS

## 2023-09-27 NOTE — H&P ADULT - NSHPLABSRESULTS_GEN_ALL_CORE
Lab Results:  CBC  CBC Full  -  ( 27 Sep 2023 03:54 )  WBC Count : 12.02 K/uL  RBC Count : 4.67 M/uL  Hemoglobin : 14.1 g/dL  Hematocrit : 43.6 %  Platelet Count - Automated : 302 K/uL  Mean Cell Volume : 93.4 fl  Mean Cell Hemoglobin : 30.2 pg  Mean Cell Hemoglobin Concentration : 32.3 gm/dL  Auto Neutrophil # : 9.43 K/uL  Auto Lymphocyte # : 1.00 K/uL  Auto Monocyte # : 1.48 K/uL  Auto Eosinophil # : 0.00 K/uL  Auto Basophil # : 0.03 K/uL  Auto Neutrophil % : 78.5 %  Auto Lymphocyte % : 8.3 %  Auto Monocyte % : 12.3 %  Auto Eosinophil % : 0.0 %  Auto Basophil % : 0.2 %    .		Differential:	[] Automated		[] Manual  Chemistry                        14.1   12.02 )-----------( 302      ( 27 Sep 2023 03:54 )             43.6     09-27    132<L>  |  100  |  9   ----------------------------<  146<H>  4.5   |  27  |  0.81    Ca    8.7      27 Sep 2023 03:54    TPro  7.3  /  Alb  3.5  /  TBili  0.3  /  DBili  x   /  AST  19  /  ALT  42  /  AlkPhos  60  09-27    LIVER FUNCTIONS - ( 27 Sep 2023 03:54 )  Alb: 3.5 g/dL / Pro: 7.3 gm/dL / ALK PHOS: 60 U/L / ALT: 42 U/L / AST: 19 U/L / GGT: x           PT/INR - ( 27 Sep 2023 03:54 )   PT: 11.9 sec;   INR: 1.05 ratio         PTT - ( 27 Sep 2023 03:54 )  PTT:27.0 sec  Urinalysis Basic - ( 27 Sep 2023 03:54 )    Color: x / Appearance: x / SG: x / pH: x  Gluc: 146 mg/dL / Ketone: x  / Bili: x / Urobili: x   Blood: x / Protein: x / Nitrite: x   Leuk Esterase: x / RBC: x / WBC x   Sq Epi: x / Non Sq Epi: x / Bacteria: x      RADIOLOGY RESULTS:    EKG not done in the ER     < from: CT Abdomen and Pelvis No Cont (09.27.23 @ 04:29) >    IMPRESSION:  No gross acute posttraumatic abnormality of the chest, abdomen and pelvis.    Scattered pulmonary nodules again seen, similar smaller than on prior CT   chest to/16/2023. Stable 9 mm ill-defined groundglass opacity at the   right lung base. Recommend follow-up CT chest in 6-12 months.    < end of copied text >    < from: CT Cervical Spine No Cont (09.27.23 @ 04:29) >    IMPRESSION:    CT Head: No acute intracranial hemorrhage or calvarial fracture.    CT cervical spine: No acute cervical spine fracture or evidence of   traumatic malalignment.    < end of copied text >

## 2023-09-27 NOTE — ED ADULT NURSE REASSESSMENT NOTE - NS ED NURSE REASSESS COMMENT FT1
Report received from night RN. Pt is resting on stretcher in treatment room. No complaints at this time. Pt is awaiting bed on floor. Safety and comfort measures maintained. Will continue to monitor.

## 2023-09-27 NOTE — H&P ADULT - HISTORY OF PRESENT ILLNESS
71 yo Female with a past medical history of Breast cancer S/P Lumpectomy, Chronic Back Pain and Bursitis on Chronic Pain Meds presents to the ER after a mechanical Fall down 9-10 steps; Both her  and her was walking down the steps when her  fell first and she fell 2nd. Patient states that she was on the floor for awhile then got up and her  took her to the ER; Patient denies any LOC, Syncope, CP, palpitations, N/v/d, dyspnea, She did have a headache initally but that is gone now.    In the ER, patient received IVF, she had an elevated lactate.

## 2023-09-27 NOTE — ED PROVIDER NOTE - PROGRESS NOTE DETAILS
pt seen by dr chavez, please see his full note. Pt signed out to me to f/u ct ch/ap and admission as pt was hypoxic at home, elev lactate. fluids ordered as well. Oriented - self; Oriented - place; Oriented - time

## 2023-09-27 NOTE — H&P ADULT - NSHPPHYSICALEXAM_GEN_ALL_CORE
PHYSICAL EXAM:  Constitutional: NAD, awake and alert  Neurological: AAO x 3, no focal deficits  HEENT: PERRLA, EOMI, MMM +bruising on right side of face and dry blood from right nostril  Neck: Soft and supple, No LAD, No JVD  Respiratory: Breath sounds are clear bilaterally, No wheezing, rales or rhonchi  Cardiovascular: S1 and S2, regular rate and rhythm; no Murmurs, gallops or rubs  Gastrointestinal: Bowel Sounds present, soft, nontender, nondistended, no guarding, no rebound tenderness  Back: No CVA tenderness   Extremities: No peripheral edema  Vascular: 2+ peripheral pulses  Musculoskeletal: 5/5 strength b/l upper and lower extremities  Skin: No rashes  Breast: Deferred  Rectal: Deferred

## 2023-09-27 NOTE — H&P ADULT - CONVERSATION DETAILS
Advanced Care Planning 35 minutes.  Discussion with patient  regarding advance directives. We discussed diagnosis, prognosis and goals of care. At this time She wishes to be fully resuscitated and mechanically ventilated if need arises. There are no limitations of any sort in her medical care and management. She is FULL CODE.

## 2023-09-27 NOTE — ED PROVIDER NOTE - NSICDXPASTMEDICALHX_GEN_ALL_CORE_FT
PAST MEDICAL HISTORY:  Breast CA left breast s/p lumpectomy on immunotherapy, no chemotherapy or radiation therapy    Bursitis of right hip     Colon polyps     Diverticular disease     H/O hemorrhoids     History of hemorrhoids     Pneumonia     Uterine fibroid      [id number]

## 2023-09-27 NOTE — H&P ADULT - ASSESSMENT
#S/P Mechanical Fall from Steps with Head trauma   -Admit to medical floors  -CT head and cspine NAD   -IVF  -neuro checks  -PT evaluation    #lactic Acidosis and Leukocytosis most likely reactive and due to dehydration  -continue to monitor   -IVF    #Chronic Back Pain and Bursitiis  -continue chronic Pain meds with Oxycodone     #Hx of ?Tachycardia and Anxiety  -continue Diazepam     #history of breast cancer   -Monitor outpatient     #Pulmonary Nodules   -Scattered pulmonary nodules again seen, similar smaller than on prior CT chest to/16/2023. Stable 9 mm ill-defined groundglass opacity at the   right lung base. Recommend follow-up CT chest in 6-12 months.    #dvt prophylaxis- heparin     #Full Code

## 2023-09-27 NOTE — ED PROVIDER NOTE - CLINICAL SUMMARY MEDICAL DECISION MAKING FREE TEXT BOX
Received request via: Pharmacy Changing Mail order service    Was the patient seen in the last year in this department? Yes LOV 07/07/2020    Does the patient have an active prescription (recently filled or refills available) for medication(s) requested? No  
Requested Prescriptions     Signed Prescriptions Disp Refills   • norgestimate-ethinyl estradiol (ORTHO-CYCLEN) 0.25-35 MG-MCG per tablet 84 Tab 3     Sig: Take 1 Tab by mouth every day.     Authorizing Provider: BEBE BAIRD A.P.R.N.    
fall, hypoxia to 88%, elevated lactate, not safe for dc, labs, imaging discussed with patient. Further inpatient admission and evaluation of hypoxia.

## 2023-09-27 NOTE — ED PROVIDER NOTE - DIFFERENTIAL DIAGNOSIS
fall, hypoxia to 88%, elevated lactate, not safe for dc, labs, imaging discussed with patient. Further inpatient admission and evaluation of hypoxia. Differential Diagnosis

## 2023-09-27 NOTE — ED ADULT TRIAGE NOTE - CHIEF COMPLAINT QUOTE
patient states every night she takes her oxygen saturation, this evening states it was low, but unable to recall the reading and her  insisted to comes to the ED to make sure she does not have pneumonia.  patient denies cough, SOB or fevers.  patient states while leaving for the ED her and her  fell down 10 steps together.  patient c/o lower back pain.  dry blood noted to nose and face.  patient denies LOC, denies anticoagulants.  GCS 15.  trauma alert called

## 2023-09-27 NOTE — ED PROVIDER NOTE - OBJECTIVE STATEMENT
73 year old female with PMH of hemorrhoids, PNA, diverticulosis presents with cc of having had a fall while going down the steps, falling from 10 steps as her spouse also took her down during the fall (both fell). Noted to be hypoxic in the ER upon presentation to high 80s. Complains of pain in the back, chest, and states had difficulty getting up and ambulating. No saddle anesthesia.

## 2023-09-28 LAB
APPEARANCE UR: CLEAR — SIGNIFICANT CHANGE UP
BACTERIA # UR AUTO: NEGATIVE — SIGNIFICANT CHANGE UP
BILIRUB UR-MCNC: NEGATIVE — SIGNIFICANT CHANGE UP
COLOR SPEC: YELLOW — SIGNIFICANT CHANGE UP
DIFF PNL FLD: NEGATIVE — SIGNIFICANT CHANGE UP
EPI CELLS # UR: SIGNIFICANT CHANGE UP
GLUCOSE UR QL: NEGATIVE — SIGNIFICANT CHANGE UP
HCT VFR BLD CALC: 37.7 % — SIGNIFICANT CHANGE UP (ref 34.5–45)
HGB BLD-MCNC: 12.8 G/DL — SIGNIFICANT CHANGE UP (ref 11.5–15.5)
KETONES UR-MCNC: NEGATIVE — SIGNIFICANT CHANGE UP
LEUKOCYTE ESTERASE UR-ACNC: ABNORMAL
MCHC RBC-ENTMCNC: 30.5 PG — SIGNIFICANT CHANGE UP (ref 27–34)
MCHC RBC-ENTMCNC: 34 GM/DL — SIGNIFICANT CHANGE UP (ref 32–36)
MCV RBC AUTO: 89.8 FL — SIGNIFICANT CHANGE UP (ref 80–100)
NITRITE UR-MCNC: NEGATIVE — SIGNIFICANT CHANGE UP
PH UR: 8 — SIGNIFICANT CHANGE UP (ref 5–8)
PLATELET # BLD AUTO: 266 K/UL — SIGNIFICANT CHANGE UP (ref 150–400)
PROT UR-MCNC: NEGATIVE — SIGNIFICANT CHANGE UP
RBC # BLD: 4.2 M/UL — SIGNIFICANT CHANGE UP (ref 3.8–5.2)
RBC # FLD: 13.2 % — SIGNIFICANT CHANGE UP (ref 10.3–14.5)
RBC CASTS # UR COMP ASSIST: SIGNIFICANT CHANGE UP /HPF (ref 0–4)
SP GR SPEC: 1.01 — SIGNIFICANT CHANGE UP (ref 1.01–1.02)
UROBILINOGEN FLD QL: NEGATIVE — SIGNIFICANT CHANGE UP
WBC # BLD: 7 K/UL — SIGNIFICANT CHANGE UP (ref 3.8–10.5)
WBC # FLD AUTO: 7 K/UL — SIGNIFICANT CHANGE UP (ref 3.8–10.5)
WBC UR QL: ABNORMAL /HPF (ref 0–5)

## 2023-09-28 PROCEDURE — 99233 SBSQ HOSP IP/OBS HIGH 50: CPT

## 2023-09-28 RX ADMIN — SODIUM CHLORIDE 75 MILLILITER(S): 9 INJECTION INTRAMUSCULAR; INTRAVENOUS; SUBCUTANEOUS at 22:08

## 2023-09-28 RX ADMIN — HEPARIN SODIUM 5000 UNIT(S): 5000 INJECTION INTRAVENOUS; SUBCUTANEOUS at 22:10

## 2023-09-28 RX ADMIN — SODIUM CHLORIDE 75 MILLILITER(S): 9 INJECTION INTRAMUSCULAR; INTRAVENOUS; SUBCUTANEOUS at 04:01

## 2023-09-28 RX ADMIN — OXYCODONE HYDROCHLORIDE 15 MILLIGRAM(S): 5 TABLET ORAL at 20:32

## 2023-09-28 RX ADMIN — CELECOXIB 200 MILLIGRAM(S): 200 CAPSULE ORAL at 09:29

## 2023-09-28 RX ADMIN — Medication 10 MILLIGRAM(S): at 09:29

## 2023-09-28 RX ADMIN — HEPARIN SODIUM 5000 UNIT(S): 5000 INJECTION INTRAVENOUS; SUBCUTANEOUS at 09:29

## 2023-09-28 RX ADMIN — OXYCODONE HYDROCHLORIDE 15 MILLIGRAM(S): 5 TABLET ORAL at 09:29

## 2023-09-28 RX ADMIN — Medication 10 MILLIGRAM(S): at 22:11

## 2023-09-28 NOTE — DIETITIAN INITIAL EVALUATION ADULT - ADD RECOMMEND
1) C/w Regular diet  2) Obtain vitamin D 25OH level to assess nutriture  3) Please obtain weekly weights  4) MVI w/ minerals daily to ensure 100% RDA met  5) Encourage protein-rich foods, maximize food preferences  6) Monitor bowel movements, if no BM for >3 days, consider implementing bowel regimen.  7) Confirm goals of care regarding nutrition support  RD will continue to monitor PO intake, labs, hydration, and wt prn.

## 2023-09-28 NOTE — DIETITIAN INITIAL EVALUATION ADULT - OTHER INFO
71 y/o F with a PMHx of Breast cancer S/P Lumpectomy, Chronic Back Pain and Bursitis on Chronic Pain Meds presented to the ER after a mechanical Fall down 9-10 steps; Both her  and her was walking down the steps when her  fell first and she fell 2nd. Patient states that she was on the floor for awhile then got up and her  took her to the ER. Admitted s/p mechanical fall from Steps with head trauma and lactic acidosis.     Reports a good appetite/ intake since admission; reports consuming >75% of her breakfast this morning. States that she does not know her UBW, however her weight is stable and her clothes have been fitting the same at home. RD obtained bedscale wt on 9/28 - 146#. Weight hx reviewed: 140# (taken by RD on 11/30/22; met criteria for severe malnutrition); weight appears stable x 10 mon. NFPE reveals mild muscle/ fat wasting, however pt does not meet criteria for PCM at this time. C/w Regular diet. Pt declines need for ONS options at this time 2/2 good po intake. Please see additional recommendations below.

## 2023-09-28 NOTE — DIETITIAN INITIAL EVALUATION ADULT - PERTINENT MEDS FT
MEDICATIONS  (STANDING):  celecoxib 200 milliGRAM(s) Oral daily  diazepam    Tablet 10 milliGRAM(s) Oral two times a day  heparin   Injectable 5000 Unit(s) SubCutaneous every 12 hours  sodium chloride 0.9%. 1000 milliLiter(s) (75 mL/Hr) IV Continuous <Continuous>    MEDICATIONS  (PRN):  acetaminophen     Tablet .. 650 milliGRAM(s) Oral every 6 hours PRN Temp greater or equal to 38C (100.4F), Mild Pain (1 - 3)  aluminum hydroxide/magnesium hydroxide/simethicone Suspension 30 milliLiter(s) Oral every 4 hours PRN Dyspepsia  melatonin 3 milliGRAM(s) Oral at bedtime PRN Insomnia  ondansetron Injectable 4 milliGRAM(s) IV Push every 8 hours PRN Nausea and/or Vomiting  oxyCODONE    IR 15 milliGRAM(s) Oral three times a day PRN Moderate Pain (4 - 6)    Home Medications:  anastrozole 1 mg oral tablet: 1 tab(s) orally once a day (27 Sep 2023 08:22)  diazePAM 10 mg oral tablet: 1 tab(s) orally 2-3 times a day (27 Sep 2023 08:22)  icosapent 1 g oral capsule: 1 cap(s) orally 2 times a day (27 Sep 2023 08:22)  meloxicam 7.5 mg oral tablet: 1 tab(s) orally once a day (27 Sep 2023 08:20)  Multiple Vitamins oral tablet: 1 tab(s) orally once a day (27 Sep 2023 08:20)  oxyCODONE 15 mg oral tablet: 1 tab(s) orally 5 times a day as needed for pain ***Patient normally takes 3 times a day*** (27 Sep 2023 08:22)

## 2023-09-28 NOTE — PHYSICAL THERAPY INITIAL EVALUATION ADULT - PERTINENT HX OF CURRENT PROBLEM, REHAB EVAL
73 yo Female with a past medical history of Breast cancer S/P Lumpectomy, Chronic Back Pain and Bursitis on Chronic Pain Meds presents to the ER after a mechanical Fall down 9-10 steps; Both her  and her was walking down the steps when her  fell first and she fell 2nd. Patient states that she was on the floor for awhile then got up and her  took her to the ER; Patient denies any LOC, Syncope, CP, palpitations, N/v/d, dyspnea, She did have a headache initially but that is gone now.

## 2023-09-28 NOTE — DIETITIAN INITIAL EVALUATION ADULT - ORAL INTAKE PTA/DIET HISTORY
Reports a good appetite/ intake pta. Normally consumes 3 meals/ day and follows a low fat/ low sugar diet at home. Does not consume any ONS. Pt does the cooking/ shopping at home. Pt states that tomatoes cause acid which makes her arthritis flare up.

## 2023-09-28 NOTE — DIETITIAN INITIAL EVALUATION ADULT - PERTINENT LABORATORY DATA
09-27    132<L>  |  100  |  9   ----------------------------<  146<H>  4.5   |  27  |  0.81    Ca    8.7      27 Sep 2023 03:54    TPro  7.3  /  Alb  3.5  /  TBili  0.3  /  DBili  x   /  AST  19  /  ALT  42  /  AlkPhos  60  09-27  A1C with Estimated Average Glucose Result: 5.8 % (11-29-22 @ 07:31)

## 2023-09-29 ENCOUNTER — TRANSCRIPTION ENCOUNTER (OUTPATIENT)
Age: 73
End: 2023-09-29

## 2023-09-29 VITALS
RESPIRATION RATE: 19 BRPM | SYSTOLIC BLOOD PRESSURE: 134 MMHG | HEART RATE: 77 BPM | TEMPERATURE: 98 F | OXYGEN SATURATION: 93 % | DIASTOLIC BLOOD PRESSURE: 83 MMHG

## 2023-09-29 LAB
ANION GAP SERPL CALC-SCNC: 3 MMOL/L — LOW (ref 5–17)
BUN SERPL-MCNC: 8 MG/DL — SIGNIFICANT CHANGE UP (ref 7–23)
CALCIUM SERPL-MCNC: 8.4 MG/DL — LOW (ref 8.5–10.1)
CHLORIDE SERPL-SCNC: 114 MMOL/L — HIGH (ref 96–108)
CO2 SERPL-SCNC: 23 MMOL/L — SIGNIFICANT CHANGE UP (ref 22–31)
CREAT SERPL-MCNC: 0.57 MG/DL — SIGNIFICANT CHANGE UP (ref 0.5–1.3)
EGFR: 97 ML/MIN/1.73M2 — SIGNIFICANT CHANGE UP
GLUCOSE SERPL-MCNC: 122 MG/DL — HIGH (ref 70–99)
HCT VFR BLD CALC: 39.5 % — SIGNIFICANT CHANGE UP (ref 34.5–45)
HGB BLD-MCNC: 13 G/DL — SIGNIFICANT CHANGE UP (ref 11.5–15.5)
MCHC RBC-ENTMCNC: 29.7 PG — SIGNIFICANT CHANGE UP (ref 27–34)
MCHC RBC-ENTMCNC: 32.9 GM/DL — SIGNIFICANT CHANGE UP (ref 32–36)
MCV RBC AUTO: 90.2 FL — SIGNIFICANT CHANGE UP (ref 80–100)
PLATELET # BLD AUTO: 285 K/UL — SIGNIFICANT CHANGE UP (ref 150–400)
POTASSIUM SERPL-MCNC: 4.1 MMOL/L — SIGNIFICANT CHANGE UP (ref 3.5–5.3)
POTASSIUM SERPL-SCNC: 4.1 MMOL/L — SIGNIFICANT CHANGE UP (ref 3.5–5.3)
RBC # BLD: 4.38 M/UL — SIGNIFICANT CHANGE UP (ref 3.8–5.2)
RBC # FLD: 13.2 % — SIGNIFICANT CHANGE UP (ref 10.3–14.5)
SODIUM SERPL-SCNC: 140 MMOL/L — SIGNIFICANT CHANGE UP (ref 135–145)
WBC # BLD: 6.1 K/UL — SIGNIFICANT CHANGE UP (ref 3.8–10.5)
WBC # FLD AUTO: 6.1 K/UL — SIGNIFICANT CHANGE UP (ref 3.8–10.5)

## 2023-09-29 PROCEDURE — 99239 HOSP IP/OBS DSCHRG MGMT >30: CPT

## 2023-09-29 RX ORDER — DIAZEPAM 5 MG
1 TABLET ORAL
Qty: 0 | Refills: 0 | DISCHARGE

## 2023-09-29 RX ADMIN — Medication 10 MILLIGRAM(S): at 10:37

## 2023-09-29 RX ADMIN — HEPARIN SODIUM 5000 UNIT(S): 5000 INJECTION INTRAVENOUS; SUBCUTANEOUS at 10:38

## 2023-09-29 RX ADMIN — CELECOXIB 200 MILLIGRAM(S): 200 CAPSULE ORAL at 10:37

## 2023-09-29 RX ADMIN — OXYCODONE HYDROCHLORIDE 15 MILLIGRAM(S): 5 TABLET ORAL at 11:08

## 2023-09-29 NOTE — DISCHARGE NOTE PROVIDER - NSDCMRMEDTOKEN_GEN_ALL_CORE_FT
anastrozole 1 mg oral tablet: 1 tab(s) orally once a day  diazePAM 10 mg oral tablet: 1 tab(s) orally 2 times a day as needed for  anxiety  icosapent 1 g oral capsule: 1 cap(s) orally 2 times a day  meloxicam 7.5 mg oral tablet: 1 tab(s) orally once a day  Multiple Vitamins oral tablet: 1 tab(s) orally once a day  oxyCODONE 15 mg oral tablet: 1 tab(s) orally 5 times a day as needed for pain ***Patient normally takes 3 times a day***

## 2023-09-29 NOTE — DISCHARGE NOTE NURSING/CASE MANAGEMENT/SOCIAL WORK - PATIENT PORTAL LINK FT
You can access the FollowMyHealth Patient Portal offered by [facility] by registering at the following website: [email or web address]. By joining [employer]’s FollowMyHealth portal, you will also be able to view your health information using other applications (apps) compatible with our system.

## 2023-09-29 NOTE — PROGRESS NOTE ADULT - SUBJECTIVE AND OBJECTIVE BOX
HPI: 73 yo Female with a past medical history of Breast cancer S/P Lumpectomy, Chronic Back Pain and Bursitis on Chronic Pain Meds presents to the ER after a mechanical Fall down 9-10 steps; Both her  and her was walking down the steps when her  fell first and she fell 2nd. Patient states that she was on the floor for awhile then got up and her  took her to the ER; Patient denies any LOC, Syncope, CP, palpitations, N/v/d, dyspnea, She did have a headache initally but that is gone now.    9/28: no new complaints      PHYSICAL EXAM:    Daily     Daily     Vital Signs Last 24 Hrs  T(C): 36.6 (28 Sep 2023 08:06), Max: 36.6 (28 Sep 2023 08:06)  T(F): 97.9 (28 Sep 2023 08:06), Max: 97.9 (28 Sep 2023 08:06)  HR: 82 (28 Sep 2023 11:29) (82 - 95)  BP: 128/58 (28 Sep 2023 11:29) (128/58 - 156/88)  BP(mean): --  RR: 18 (28 Sep 2023 11:29) (18 - 18)  SpO2: 97% (28 Sep 2023 11:29) (95% - 97%)    Constitutional: Weak  appearing  HEENT: Atraumatic, ELIZABETH,   Respiratory: Breath Sounds normal, no rhonchi/wheeze  Cardiovascular: N S1S2;   Gastrointestinal: Abdomen soft, non tender, Bowel Sounds present  Extremities: No edema, peripheral pulses present  Neurological: AAO x 3, no gross focal motor deficits  Skin: Non cellulitic, no rash, ulcers  Lymph Nodes: No lymphadenopathy noted  Back: No CVA tenderness   Musculoskeletal: non tender  Breasts: Deferred  Genitourinary: deferred  Rectal: Deferred    All Labs/EKG/Radiology/Meds reviewed by me                          12.8   7.00  )-----------( 266      ( 28 Sep 2023 09:57 )             37.7       CBC Full  -  ( 28 Sep 2023 09:57 )  WBC Count : 7.00 K/uL  RBC Count : 4.20 M/uL  Hemoglobin : 12.8 g/dL  Hematocrit : 37.7 %  Platelet Count - Automated : 266 K/uL  Mean Cell Volume : 89.8 fl  Mean Cell Hemoglobin : 30.5 pg  Mean Cell Hemoglobin Concentration : 34.0 gm/dL  Auto Neutrophil # : x  Auto Lymphocyte # : x  Auto Monocyte # : x  Auto Eosinophil # : x  Auto Basophil # : x  Auto Neutrophil % : x  Auto Lymphocyte % : x  Auto Monocyte % : x  Auto Eosinophil % : x  Auto Basophil % : x      09-27    132<L>  |  100  |  9   ----------------------------<  146<H>  4.5   |  27  |  0.81    Ca    8.7      27 Sep 2023 03:54    TPro  7.3  /  Alb  3.5  /  TBili  0.3  /  DBili  x   /  AST  19  /  ALT  42  /  AlkPhos  60  09-27      LIVER FUNCTIONS - ( 27 Sep 2023 03:54 )  Alb: 3.5 g/dL / Pro: 7.3 gm/dL / ALK PHOS: 60 U/L / ALT: 42 U/L / AST: 19 U/L / GGT: x             PT/INR - ( 27 Sep 2023 03:54 )   PT: 11.9 sec;   INR: 1.05 ratio         PTT - ( 27 Sep 2023 03:54 )  PTT:27.0 sec          Urinalysis Basic - ( 27 Sep 2023 03:54 )    Color: x / Appearance: x / SG: x / pH: x  Gluc: 146 mg/dL / Ketone: x  / Bili: x / Urobili: x   Blood: x / Protein: x / Nitrite: x   Leuk Esterase: x / RBC: x / WBC x   Sq Epi: x / Non Sq Epi: x / Bacteria: x            MEDICATIONS  (STANDING):  celecoxib 200 milliGRAM(s) Oral daily  diazepam    Tablet 10 milliGRAM(s) Oral two times a day  heparin   Injectable 5000 Unit(s) SubCutaneous every 12 hours  sodium chloride 0.9%. 1000 milliLiter(s) (75 mL/Hr) IV Continuous <Continuous>    MEDICATIONS  (PRN):  acetaminophen     Tablet .. 650 milliGRAM(s) Oral every 6 hours PRN Temp greater or equal to 38C (100.4F), Mild Pain (1 - 3)  aluminum hydroxide/magnesium hydroxide/simethicone Suspension 30 milliLiter(s) Oral every 4 hours PRN Dyspepsia  melatonin 3 milliGRAM(s) Oral at bedtime PRN Insomnia  ondansetron Injectable 4 milliGRAM(s) IV Push every 8 hours PRN Nausea and/or Vomiting  oxyCODONE    IR 15 milliGRAM(s) Oral three times a day PRN Moderate Pain (4 - 6)    
71 yo Female with a past medical history of Breast cancer S/P Lumpectomy, Chronic Back Pain and Bursitis on Chronic Pain Meds presents to the ER after a mechanical Fall down 9-10 steps; Both her  and her was walking down the steps when her  fell first and she fell 2nd. Patient states that she was on the floor for awhile then got up and her  took her to the ER; Patient denies any LOC, Syncope, CP, palpitations, N/v/d, dyspnea, She did have a headache initally but that is gone now.    9/28: no new complaints      PHYSICAL EXAM:    Daily     Daily     ICU Vital Signs Last 24 Hrs  T(C): 36.5 (29 Sep 2023 07:58), Max: 37.2 (28 Sep 2023 15:10)  T(F): 97.7 (29 Sep 2023 07:58), Max: 99 (28 Sep 2023 15:10)  HR: 77 (29 Sep 2023 07:58) (77 - 93)  BP: 134/83 (29 Sep 2023 07:58) (122/83 - 139/82)  BP(mean): --  ABP: --  ABP(mean): --  RR: 19 (29 Sep 2023 07:58) (18 - 19)  SpO2: 93% (29 Sep 2023 07:58) (93% - 97%)    O2 Parameters below as of 29 Sep 2023 07:58  Patient On (Oxygen Delivery Method): room air    PHYSICAL EXAM:Constitutional: Weak  appearing  HEENT: Atraumatic, ELIZABETH,   Respiratory: Breath Sounds normal, no rhonchi/wheeze  Cardiovascular: N S1S2;   Gastrointestinal: Abdomen soft, non tender, Bowel Sounds present  Extremities: No edema, peripheral pulses present  Neurological: AAO x 3, no gross focal motor deficits  Skin: Non cellulitic, no rash, ulcers  Lymph Nodes: No lymphadenopathy noted  Back: No CVA tenderness   Musculoskeletal: non tender  Breasts: Deferred  Genitourinary: deferred  Rectal: Deferred    All Labs/EKG/Radiology/Meds reviewed by me                                      13.0   6.10  )-----------( 285      ( 29 Sep 2023 06:32 )             39.5       CBC Full  -  ( 29 Sep 2023 06:32 )  WBC Count : 6.10 K/uL  RBC Count : 4.38 M/uL  Hemoglobin : 13.0 g/dL  Hematocrit : 39.5 %  Platelet Count - Automated : 285 K/uL  Mean Cell Volume : 90.2 fl  Mean Cell Hemoglobin : 29.7 pg  Mean Cell Hemoglobin Concentration : 32.9 gm/dL  Auto Neutrophil # : x  Auto Lymphocyte # : x  Auto Monocyte # : x  Auto Eosinophil # : x  Auto Basophil # : x  Auto Neutrophil % : x  Auto Lymphocyte % : x  Auto Monocyte % : x  Auto Eosinophil % : x  Auto Basophil % : x      09-29    140  |  114<H>  |  8   ----------------------------<  122<H>  4.1   |  23  |  0.57    Ca    8.4<L>      29 Sep 2023 06:32                      Urinalysis Basic - ( 29 Sep 2023 06:32 )    Color: x / Appearance: x / SG: x / pH: x  Gluc: 122 mg/dL / Ketone: x  / Bili: x / Urobili: x   Blood: x / Protein: x / Nitrite: x   Leuk Esterase: x / RBC: x / WBC x   Sq Epi: x / Non Sq Epi: x / Bacteria: x            MEDICATIONS  (STANDING):  celecoxib 200 milliGRAM(s) Oral daily  diazepam    Tablet 10 milliGRAM(s) Oral two times a day  heparin   Injectable 5000 Unit(s) SubCutaneous every 12 hours  sodium chloride 0.9%. 1000 milliLiter(s) (75 mL/Hr) IV Continuous <Continuous>

## 2023-09-29 NOTE — DISCHARGE NOTE PROVIDER - HOSPITAL COURSE
HPI in ED- 71 yo Female with a past medical history of Breast cancer S/P Lumpectomy, Chronic Back Pain and Bursitis on Chronic Pain Meds presents to the ER after a mechanical Fall down 9-10 steps; Both her  and her was walking down the steps when her  fell first and she fell 2nd. Patient states that she was on the floor for awhile then got up and her  took her to the ER; Patient denies any LOC, Syncope, CP, palpitations, N/v/d, dyspnea, She did have a headache initally but that is gone now  Hospital course   .73/F admitted with :    #S/P Mechanical Fall from Steps with Head trauma   CT head and cspine NAD   s/p IVF   UA neg for UTI  Ct A/P   No gross acute posttraumatic abnormality of the chest, abdomen and pelvis.  Scattered pulmonary nodules again seen, similar smaller than on prior CT   chest to/16/2023. Stable 9 mm ill-defined groundglass opacity at the   right lung base. Recommend follow-up CT chest in 6-12 months.    #lactic Acidosis and Leukocytosis most likely reactive and due to dehydration- resolved  s/p IVF  lactate normalized    #Chronic Back Pain and Bursitiis  -continue chronic Pain meds with Oxycodone     #Hx of ?Tachycardia and Anxiety  -continue Diazepam   tachycardia resolved     #history of breast cancer   -Monitor outpatient     #Pulmonary Nodules   -Scattered pulmonary nodules again seen, similar smaller than on prior CT chest to/16/2023. Stable 9 mm ill-defined groundglass opacity at the   right lung base. Recommend follow-up CT chest in 6-12 months.        #Full Code   9/29: no new complaints    PHYSICAL EXAM:Constitutional: NAD , ambulated to bathroom  HEENT: Atraumatic, ELIZABETH,   Respiratory: Breath Sounds normal, no rhonchi/wheeze  Cardiovascular: N S1S2;   Gastrointestinal: Abdomen soft, non tender, Bowel Sounds present  Extremities: No edema, peripheral pulses present  Neurological: AAO x 3, no gross focal motor deficits  Skin: Non cellulitic, no rash, ulcers    discharge time 47mins

## 2023-09-29 NOTE — PROGRESS NOTE ADULT - ASSESSMENT
73/F admitted with :    #S/P Mechanical Fall from Steps with Head trauma   -Admit to medical floors  -CT head and cspine NAD   -IVF  -PT evaluation: needs RW  check UA    #lactic Acidosis and Leukocytosis most likely reactive and due to dehydration  -continue to monitor   -IVF  lactate normalized    #Chronic Back Pain and Bursitiis  -continue chronic Pain meds with Oxycodone     #Hx of ?Tachycardia and Anxiety  -continue Diazepam     #history of breast cancer   -Monitor outpatient     #Pulmonary Nodules   -Scattered pulmonary nodules again seen, similar smaller than on prior CT chest to/16/2023. Stable 9 mm ill-defined groundglass opacity at the   right lung base. Recommend follow-up CT chest in 6-12 months.    #dvt prophylaxis- heparin     #Full Code     
73/F admitted with :    #S/P Mechanical Fall from Steps with Head trauma   -Admit to medical floors  -CT head and cspine NAD   -IVF  -PT evaluation: needs RW  check UA    #lactic Acidosis and Leukocytosis most likely reactive and due to dehydration  -continue to monitor   -IVF  lactate normalized    #Chronic Back Pain and Bursitiis  -continue chronic Pain meds with Oxycodone     #Hx of ?Tachycardia and Anxiety  -continue Diazepam     #history of breast cancer   -Monitor outpatient     #Pulmonary Nodules   -Scattered pulmonary nodules again seen, similar smaller than on prior CT chest to/16/2023. Stable 9 mm ill-defined groundglass opacity at the   right lung base. Recommend follow-up CT chest in 6-12 months.    #dvt prophylaxis- heparin     #Full Code

## 2023-09-29 NOTE — DISCHARGE NOTE PROVIDER - NSDCCPCAREPLAN_GEN_ALL_CORE_FT
PRINCIPAL DISCHARGE DIAGNOSIS  Diagnosis: Dehydration  Assessment and Plan of Treatment: please follow up with your primary doctor for outpatient follow up in 1 to2 weeks  you were treated for dehydration

## 2023-09-30 LAB
CULTURE RESULTS: SIGNIFICANT CHANGE UP
SPECIMEN SOURCE: SIGNIFICANT CHANGE UP

## 2023-10-04 DIAGNOSIS — M54.9 DORSALGIA, UNSPECIFIED: ICD-10-CM

## 2023-10-04 DIAGNOSIS — Z85.3 PERSONAL HISTORY OF MALIGNANT NEOPLASM OF BREAST: ICD-10-CM

## 2023-10-04 DIAGNOSIS — K57.90 DIVERTICULOSIS OF INTESTINE, PART UNSPECIFIED, WITHOUT PERFORATION OR ABSCESS WITHOUT BLEEDING: ICD-10-CM

## 2023-10-04 DIAGNOSIS — E86.0 DEHYDRATION: ICD-10-CM

## 2023-10-04 DIAGNOSIS — R00.0 TACHYCARDIA, UNSPECIFIED: ICD-10-CM

## 2023-10-04 DIAGNOSIS — E87.20 ACIDOSIS, UNSPECIFIED: ICD-10-CM

## 2023-10-04 DIAGNOSIS — K64.9 UNSPECIFIED HEMORRHOIDS: ICD-10-CM

## 2023-10-04 DIAGNOSIS — M71.9 BURSOPATHY, UNSPECIFIED: ICD-10-CM

## 2023-10-04 DIAGNOSIS — G89.29 OTHER CHRONIC PAIN: ICD-10-CM

## 2023-10-04 DIAGNOSIS — J96.01 ACUTE RESPIRATORY FAILURE WITH HYPOXIA: ICD-10-CM
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
## 2023-10-04 DIAGNOSIS — R91.8 OTHER NONSPECIFIC ABNORMAL FINDING OF LUNG FIELD: ICD-10-CM

## 2023-10-04 DIAGNOSIS — S06.9XAA UNSPECIFIED INTRACRANIAL INJURY WITH LOSS OF CONSCIOUSNESS STATUS UNKNOWN, INITIAL ENCOUNTER: ICD-10-CM

## 2023-10-04 DIAGNOSIS — R09.02 HYPOXEMIA: ICD-10-CM

## 2023-10-04 DIAGNOSIS — Z90.710 ACQUIRED ABSENCE OF BOTH CERVIX AND UTERUS: ICD-10-CM
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
## 2023-11-27 NOTE — PATIENT PROFILE ADULT - NSTRANSFERBELONGINGSDISPO_GEN_A_NUR
LAST REFILL - 08/31/23  LAST VISIT - 03/14/23  NEXT VISIT - not scheduled    30 day supply pended with note to patient to schedule appt with PCP  
with patient

## 2024-02-23 ENCOUNTER — OFFICE (OUTPATIENT)
Dept: URBAN - METROPOLITAN AREA CLINIC 102 | Facility: CLINIC | Age: 74
Setting detail: OPHTHALMOLOGY
End: 2024-02-23
Payer: MEDICARE

## 2024-02-23 DIAGNOSIS — H25.13: ICD-10-CM

## 2024-02-23 DIAGNOSIS — H52.7: ICD-10-CM

## 2024-02-23 PROCEDURE — 92015 DETERMINE REFRACTIVE STATE: CPT | Performed by: STUDENT IN AN ORGANIZED HEALTH CARE EDUCATION/TRAINING PROGRAM

## 2024-02-23 PROCEDURE — 92004 COMPRE OPH EXAM NEW PT 1/>: CPT | Performed by: STUDENT IN AN ORGANIZED HEALTH CARE EDUCATION/TRAINING PROGRAM

## 2024-02-23 ASSESSMENT — REFRACTION_CURRENTRX
OS_CYLINDER: -1.50
OS_ADD: +2.75
OS_OVR_VA: 20/
OS_AXIS: 154
OD_OVR_VA: 20/
OD_CYLINDER: -1.25
OD_SPHERE: -4.00
OS_VPRISM_DIRECTION: PROGS
OD_ADD: +2.75
OD_AXIS: 32
OS_SPHERE: -4.50
OD_VPRISM_DIRECTION: PROGS

## 2024-02-23 ASSESSMENT — CONFRONTATIONAL VISUAL FIELD TEST (CVF)
OD_FINDINGS: FULL
OS_FINDINGS: FULL

## 2024-02-23 ASSESSMENT — REFRACTION_MANIFEST
OD_CYLINDER: -1.25
OS_VA1: 20/30
OD_VA1: 20/30
OS_AXIS: 154
OD_ADD: +2.75
OS_ADD: +2.75
OD_SPHERE: -4.00
OS_CYLINDER: -1.50
OS_SPHERE: -4.50
OD_AXIS: 32

## 2024-02-23 ASSESSMENT — REFRACTION_AUTOREFRACTION
OD_SPHERE: -2.25
OD_AXIS: 27
OS_SPHERE: -3.75
OS_AXIS: 170
OS_CYLINDER: -1.50
OD_CYLINDER: -2.25

## 2024-02-23 ASSESSMENT — SPHEQUIV_DERIVED
OD_SPHEQUIV: -3.375
OD_SPHEQUIV: -4.625
OS_SPHEQUIV: -5.25
OS_SPHEQUIV: -4.5

## 2024-03-26 NOTE — ED ADULT TRIAGE NOTE - HEIGHT IN CM
If you receive a survey via e-mail or mail, please take the time to complete and return as your feedback is very helpful to our practice.            If your neurological testing is not going to be scheduled today our Pre-Service Department will contact you to schedule within one to two days. If you would like to call and schedule when it is convenient for you or if you need to reschedule your appointment please call the Pre-Service Department at [phone].    Your tests will be reviewed by the Benefits Department and if there are any concerns regarding prior authorization or financial obligation they will contact you. We recommend you still contact your insurance company to see if the test, provider, and location of service are covered, and if so, will there be any out of pocket expenses.     If you should have any concerns regarding the financial obligation of the tests please contact our Financial Advocates at [phone] and they will be happy to assist you.                 Thank you for letting us care for you today.          In order to make sure we are meeting our patients' needs, we require a 36 hour notice from you prior to picking up your medications. Attached is a table that will help you determine when your prescriptions will be ready for pick-up.                      If the refill is requested between when the clinic opens and 12 pm on  You can  your prescription at the pharmacy after 6 PM on   Monday Tuesday Tuesday Wednesday Wednesday Thursday Thursday Friday Friday Monday     If the refill is requested between 12 pm and after the clinic closes on You can  your prescription at the pharmacy after 12 PM on   Monday Wednesday Tuesday Thursday Wednesday Friday Thursday Monday Friday Tuesday      160.02

## 2024-07-29 ENCOUNTER — OFFICE (OUTPATIENT)
Dept: URBAN - METROPOLITAN AREA CLINIC 12 | Facility: CLINIC | Age: 74
Setting detail: OPHTHALMOLOGY
End: 2024-07-29
Payer: MEDICARE

## 2024-07-29 DIAGNOSIS — D31.32: ICD-10-CM

## 2024-07-29 DIAGNOSIS — H25.12: ICD-10-CM

## 2024-07-29 DIAGNOSIS — H25.13: ICD-10-CM

## 2024-07-29 DIAGNOSIS — H35.411: ICD-10-CM

## 2024-07-29 DIAGNOSIS — H25.11: ICD-10-CM

## 2024-07-29 PROCEDURE — 92250 FUNDUS PHOTOGRAPHY W/I&R: CPT | Performed by: OPHTHALMOLOGY

## 2024-07-29 PROCEDURE — 99214 OFFICE O/P EST MOD 30 MIN: CPT | Performed by: OPHTHALMOLOGY

## 2024-07-29 ASSESSMENT — CONFRONTATIONAL VISUAL FIELD TEST (CVF)
OD_FINDINGS: FULL
OS_FINDINGS: FULL

## 2024-09-25 NOTE — PATIENT PROFILE ADULT - CAREGIVER PHONE NUMBER
Detail Level: Detailed Quality 226: Preventive Care And Screening: Tobacco Use: Screening And Cessation Intervention: Tobacco Screening not Performed [id number]

## 2024-10-01 NOTE — CONSULT NOTE ADULT - CONSULT REQUESTED BY NAME
October 4, 2024      Bayron Vu  [id number] [facility] [id number]        Dear ,    We are writing to inform you of your test results.    Bayron,     Your recent blood test show that you have normal kidney function.  It is down slightly from last year, but still considered to maintain kidney function, drink plenty of water and use a minimum of ibuprofen for aches and pains.     Your hemoglobin A1c, 3-month average of your blood sugar readings, is up slightly at 5.8.  Continue with your regular exercise and try to cut down the carbohydrates.  This test should be done yearly.     Your urine test for protein was normal, no signs of excessive protein, or albumin,  in your urine. If elevated, this can be a sign of kidney damage. This test is recommended yearly for people with high blood pressure or diabetes.     Your cholesterol is excellent and there is no signs of prostate cancer.     Please continue on your current medications and come in for a yearly physical.  Call, or use "[email or web address]", with any questions or concerns.     Resulted Orders   PSA, screen   Result Value Ref Range    Prostate Specific Antigen Screen 1.42 0.00 - 3.50 ng/mL    Narrative    This result is obtained using the Roche Elecsys total PSA method on the katerine e801 immunoassay analyzer, which is an ultrasensitive method. Results obtained with different assay methods or kits cannot be used interchangeably.  This test is intended for initial prostate cancer screening. PSA values exceeding the age-specific limits are suspicious for prostate disease, but additional testing, such as prostate biopsy, is needed to diagnose prostate pathology. The American Cancer Society recommends annual examination with digital rectal examination and serum PSA beginning at age 50 and for men with a life expectancy of at least 10 years after detection of prostate cancer. For men in high-risk groups, such as  Americans or men with a first-degree  relative diagnosed at a younger age, testing should begin at a younger age. It is generally recommended that information be provided to patients about the benefits and limitations of testing and treatment so they can make informed decisions.   Hemoglobin A1c   Result Value Ref Range    Estimated Average Glucose 120 (H) <117 mg/dL    Hemoglobin A1C 5.8 (H) 0.0 - 5.6 %      Comment:      Normal <5.7%   Prediabetes 5.7-6.4%    Diabetes 6.5% or higher     Note: Adopted from ADA consensus guidelines.   Lipid panel reflex to direct LDL Fasting   Result Value Ref Range    Cholesterol 148 <200 mg/dL    Triglycerides 44 <150 mg/dL    Direct Measure HDL 61 >=40 mg/dL    LDL Cholesterol Calculated 78 <100 mg/dL    Non HDL Cholesterol 87 <130 mg/dL    Patient Fasting > 8hrs? Yes     Narrative    Cholesterol  Desirable: < 200 mg/dL  Borderline High: 200 - 239 mg/dL  High: >= 240 mg/dL    Triglycerides  Normal: < 150 mg/dL  Borderline High: 150 - 199 mg/dL  High: 200-499 mg/dL  Very High: >= 500 mg/dL    Direct Measure HDL  Female: >= 50 mg/dL   Male: >= 40 mg/dL    LDL Cholesterol  Desirable: < 100 mg/dL  Above Desirable: 100 - 129 mg/dL   Borderline High: 130 - 159 mg/dL   High:  160 - 189 mg/dL   Very High: >= 190 mg/dL    Non HDL Cholesterol  Desirable: < 130 mg/dL  Above Desirable: 130 - 159 mg/dL  Borderline High: 160 - 189 mg/dL  High: 190 - 219 mg/dL  Very High: >= 220 mg/dL   Albumin Random Urine Quantitative with Creat Ratio   Result Value Ref Range    Creatinine Urine mg/dL 206.0 mg/dL      Comment:      The reference ranges have not been established in urine creatinine. The results should be integrated into the clinical context for interpretation.    Albumin Urine mg/L <12.0 mg/L      Comment:      The reference ranges have not been established in urine albumin. The results should be integrated into the clinical context for interpretation.    Albumin Urine mg/g Cr        Comment:      Unable to calculate, urine  albumin and/or urine creatinine is outside detectable limits.  Microalbuminuria is defined as an albumin:creatinine ratio of 17 to 299 for males and 25 to 299 for females. A ratio of albumin:creatinine of 300 or higher is indicative of overt proteinuria.  Due to biologic variability, positive results should be confirmed by a second, first-morning random or 24-hour timed urine specimen. If there is discrepancy, a third specimen is recommended. When 2 out of 3 results are in the microalbuminuria range, this is evidence for incipient nephropathy and warrants increased efforts at glucose control, blood pressure control, and institution of therapy with an angiotensin-converting-enzyme (ACE) inhibitor (if the patient can tolerate it).     BASIC METABOLIC PANEL   Result Value Ref Range    Sodium 138 135 - 145 mmol/L    Potassium 4.7 3.4 - 5.3 mmol/L    Chloride 105 98 - 107 mmol/L    Carbon Dioxide (CO2) 22 22 - 29 mmol/L    Anion Gap 11 7 - 15 mmol/L    Urea Nitrogen 34.2 (H) 6.0 - 20.0 mg/dL    Creatinine 1.26 (H) 0.67 - 1.17 mg/dL    GFR Estimate 68 >60 mL/min/1.73m2      Comment:      eGFR calculated using 2021 CKD-EPI equation.    Calcium 9.4 8.8 - 10.4 mg/dL      Comment:      Reference intervals for this test were updated on 7/16/2024 to reflect our healthy population more accurately. There may be differences in the flagging of prior results with similar values performed with this method. Those prior results can be interpreted in the context of the updated reference intervals.    Glucose 117 (H) 70 - 99 mg/dL    Patient Fasting > 8hrs? Yes        If you have any questions or concerns, please call the clinic at the number listed above.       Sincerely,      Shania Mclain MD             DR WHEAT

## 2024-12-01 ENCOUNTER — EMERGENCY (EMERGENCY)
Facility: HOSPITAL | Age: 74
LOS: 0 days | Discharge: HOME | End: 2024-12-01
Attending: EMERGENCY MEDICINE
Payer: MEDICARE

## 2024-12-01 VITALS
SYSTOLIC BLOOD PRESSURE: 150 MMHG | TEMPERATURE: 99 F | DIASTOLIC BLOOD PRESSURE: 91 MMHG | HEART RATE: 125 BPM | WEIGHT: 136.47 LBS | OXYGEN SATURATION: 97 % | RESPIRATION RATE: 18 BRPM

## 2024-12-01 VITALS
RESPIRATION RATE: 16 BRPM | DIASTOLIC BLOOD PRESSURE: 82 MMHG | OXYGEN SATURATION: 98 % | SYSTOLIC BLOOD PRESSURE: 144 MMHG | HEART RATE: 84 BPM | TEMPERATURE: 98 F

## 2024-12-01 DIAGNOSIS — G89.29 OTHER CHRONIC PAIN: ICD-10-CM

## 2024-12-01 DIAGNOSIS — Z98.890 OTHER SPECIFIED POSTPROCEDURAL STATES: Chronic | ICD-10-CM

## 2024-12-01 DIAGNOSIS — R19.7 DIARRHEA, UNSPECIFIED: ICD-10-CM

## 2024-12-01 DIAGNOSIS — N39.0 URINARY TRACT INFECTION, SITE NOT SPECIFIED: ICD-10-CM

## 2024-12-01 DIAGNOSIS — R53.1 WEAKNESS: ICD-10-CM

## 2024-12-01 DIAGNOSIS — K52.9 NONINFECTIVE GASTROENTERITIS AND COLITIS, UNSPECIFIED: ICD-10-CM

## 2024-12-01 DIAGNOSIS — C50.919 MALIGNANT NEOPLASM OF UNSPECIFIED SITE OF UNSPECIFIED FEMALE BREAST: ICD-10-CM

## 2024-12-01 DIAGNOSIS — E86.0 DEHYDRATION: ICD-10-CM

## 2024-12-01 DIAGNOSIS — R00.0 TACHYCARDIA, UNSPECIFIED: ICD-10-CM

## 2024-12-01 DIAGNOSIS — Z90.710 ACQUIRED ABSENCE OF BOTH CERVIX AND UTERUS: Chronic | ICD-10-CM

## 2024-12-01 DIAGNOSIS — D72.829 ELEVATED WHITE BLOOD CELL COUNT, UNSPECIFIED: ICD-10-CM

## 2024-12-01 DIAGNOSIS — K57.90 DIVERTICULOSIS OF INTESTINE, PART UNSPECIFIED, WITHOUT PERFORATION OR ABSCESS WITHOUT BLEEDING: ICD-10-CM

## 2024-12-01 DIAGNOSIS — R91.8 OTHER NONSPECIFIC ABNORMAL FINDING OF LUNG FIELD: ICD-10-CM

## 2024-12-01 DIAGNOSIS — I45.10 UNSPECIFIED RIGHT BUNDLE-BRANCH BLOCK: ICD-10-CM

## 2024-12-01 DIAGNOSIS — I44.4 LEFT ANTERIOR FASCICULAR BLOCK: ICD-10-CM

## 2024-12-01 DIAGNOSIS — R11.2 NAUSEA WITH VOMITING, UNSPECIFIED: ICD-10-CM

## 2024-12-01 LAB
ALBUMIN SERPL ELPH-MCNC: 4 G/DL — SIGNIFICANT CHANGE UP (ref 3.3–5)
ALP SERPL-CCNC: 64 U/L — SIGNIFICANT CHANGE UP (ref 40–120)
ALT FLD-CCNC: 19 U/L — SIGNIFICANT CHANGE UP (ref 12–78)
ANION GAP SERPL CALC-SCNC: 9 MMOL/L — SIGNIFICANT CHANGE UP (ref 5–17)
APPEARANCE UR: CLEAR — SIGNIFICANT CHANGE UP
AST SERPL-CCNC: 14 U/L — LOW (ref 15–37)
BACTERIA # UR AUTO: NEGATIVE /HPF — SIGNIFICANT CHANGE UP
BASOPHILS # BLD AUTO: 0.05 K/UL — SIGNIFICANT CHANGE UP (ref 0–0.2)
BASOPHILS NFR BLD AUTO: 0.4 % — SIGNIFICANT CHANGE UP (ref 0–2)
BILIRUB SERPL-MCNC: 0.5 MG/DL — SIGNIFICANT CHANGE UP (ref 0.2–1.2)
BILIRUB UR-MCNC: NEGATIVE — SIGNIFICANT CHANGE UP
BUN SERPL-MCNC: 12 MG/DL — SIGNIFICANT CHANGE UP (ref 7–23)
CALCIUM SERPL-MCNC: 10.5 MG/DL — HIGH (ref 8.5–10.1)
CAST: 0 /LPF — SIGNIFICANT CHANGE UP (ref 0–4)
CHLORIDE SERPL-SCNC: 97 MMOL/L — SIGNIFICANT CHANGE UP (ref 96–108)
CO2 SERPL-SCNC: 26 MMOL/L — SIGNIFICANT CHANGE UP (ref 22–31)
COLOR SPEC: YELLOW — SIGNIFICANT CHANGE UP
CREAT SERPL-MCNC: 0.81 MG/DL — SIGNIFICANT CHANGE UP (ref 0.5–1.3)
DIFF PNL FLD: NEGATIVE — SIGNIFICANT CHANGE UP
EGFR: 77 ML/MIN/1.73M2 — SIGNIFICANT CHANGE UP
EOSINOPHIL # BLD AUTO: 0.02 K/UL — SIGNIFICANT CHANGE UP (ref 0–0.5)
EOSINOPHIL NFR BLD AUTO: 0.2 % — SIGNIFICANT CHANGE UP (ref 0–6)
FLUAV AG NPH QL: SIGNIFICANT CHANGE UP
FLUBV AG NPH QL: SIGNIFICANT CHANGE UP
GLUCOSE SERPL-MCNC: 122 MG/DL — HIGH (ref 70–99)
GLUCOSE UR QL: NEGATIVE MG/DL — SIGNIFICANT CHANGE UP
HCT VFR BLD CALC: 49.6 % — HIGH (ref 34.5–45)
HGB BLD-MCNC: 16.9 G/DL — HIGH (ref 11.5–15.5)
IMM GRANULOCYTES NFR BLD AUTO: 0.3 % — SIGNIFICANT CHANGE UP (ref 0–0.9)
KETONES UR-MCNC: ABNORMAL MG/DL
LACTATE SERPL-SCNC: 1.7 MMOL/L — SIGNIFICANT CHANGE UP (ref 0.7–2)
LEUKOCYTE ESTERASE UR-ACNC: ABNORMAL
LIDOCAIN IGE QN: 50 U/L — SIGNIFICANT CHANGE UP (ref 13–75)
LYMPHOCYTES # BLD AUTO: 17.7 % — SIGNIFICANT CHANGE UP (ref 13–44)
LYMPHOCYTES # BLD AUTO: 2.25 K/UL — SIGNIFICANT CHANGE UP (ref 1–3.3)
MAGNESIUM SERPL-MCNC: 2.6 MG/DL — SIGNIFICANT CHANGE UP (ref 1.6–2.6)
MANUAL SMEAR VERIFICATION: SIGNIFICANT CHANGE UP
MCHC RBC-ENTMCNC: 29.8 PG — SIGNIFICANT CHANGE UP (ref 27–34)
MCHC RBC-ENTMCNC: 34.1 G/DL — SIGNIFICANT CHANGE UP (ref 32–36)
MCV RBC AUTO: 87.3 FL — SIGNIFICANT CHANGE UP (ref 80–100)
MONOCYTES # BLD AUTO: 1.65 K/UL — HIGH (ref 0–0.9)
MONOCYTES NFR BLD AUTO: 13 % — SIGNIFICANT CHANGE UP (ref 2–14)
NEUTROPHILS # BLD AUTO: 8.67 K/UL — HIGH (ref 1.8–7.4)
NEUTROPHILS NFR BLD AUTO: 68.4 % — SIGNIFICANT CHANGE UP (ref 43–77)
NITRITE UR-MCNC: NEGATIVE — SIGNIFICANT CHANGE UP
PH UR: 6.5 — SIGNIFICANT CHANGE UP (ref 5–8)
PLAT MORPH BLD: NORMAL — SIGNIFICANT CHANGE UP
PLATELET # BLD AUTO: 460 K/UL — HIGH (ref 150–400)
POTASSIUM SERPL-MCNC: 3.4 MMOL/L — LOW (ref 3.5–5.3)
POTASSIUM SERPL-SCNC: 3.4 MMOL/L — LOW (ref 3.5–5.3)
PROT SERPL-MCNC: 8.3 GM/DL — SIGNIFICANT CHANGE UP (ref 6–8.3)
PROT UR-MCNC: NEGATIVE MG/DL — SIGNIFICANT CHANGE UP
RBC # BLD: 5.68 M/UL — HIGH (ref 3.8–5.2)
RBC # FLD: 12.6 % — SIGNIFICANT CHANGE UP (ref 10.3–14.5)
RBC BLD AUTO: NORMAL — SIGNIFICANT CHANGE UP
RBC CASTS # UR COMP ASSIST: 1 /HPF — SIGNIFICANT CHANGE UP (ref 0–4)
RSV RNA NPH QL NAA+NON-PROBE: SIGNIFICANT CHANGE UP
SARS-COV-2 RNA SPEC QL NAA+PROBE: SIGNIFICANT CHANGE UP
SODIUM SERPL-SCNC: 132 MMOL/L — LOW (ref 135–145)
SP GR SPEC: >1.03 — HIGH (ref 1–1.03)
SQUAMOUS # UR AUTO: 0 /HPF — SIGNIFICANT CHANGE UP (ref 0–5)
TROPONIN I, HIGH SENSITIVITY RESULT: 5.17 NG/L — SIGNIFICANT CHANGE UP
UROBILINOGEN FLD QL: 0.2 MG/DL — SIGNIFICANT CHANGE UP (ref 0.2–1)
WBC # BLD: 12.68 K/UL — HIGH (ref 3.8–10.5)
WBC # FLD AUTO: 12.68 K/UL — HIGH (ref 3.8–10.5)
WBC UR QL: 34 /HPF — HIGH (ref 0–5)

## 2024-12-01 PROCEDURE — 74177 CT ABD & PELVIS W/CONTRAST: CPT | Mod: 26,MC

## 2024-12-01 PROCEDURE — 96375 TX/PRO/DX INJ NEW DRUG ADDON: CPT

## 2024-12-01 PROCEDURE — 87186 SC STD MICRODIL/AGAR DIL: CPT

## 2024-12-01 PROCEDURE — 99285 EMERGENCY DEPT VISIT HI MDM: CPT | Mod: 25

## 2024-12-01 PROCEDURE — 71045 X-RAY EXAM CHEST 1 VIEW: CPT

## 2024-12-01 PROCEDURE — 80053 COMPREHEN METABOLIC PANEL: CPT

## 2024-12-01 PROCEDURE — 87077 CULTURE AEROBIC IDENTIFY: CPT

## 2024-12-01 PROCEDURE — 87040 BLOOD CULTURE FOR BACTERIA: CPT

## 2024-12-01 PROCEDURE — 87086 URINE CULTURE/COLONY COUNT: CPT

## 2024-12-01 PROCEDURE — 0241U: CPT

## 2024-12-01 PROCEDURE — 71045 X-RAY EXAM CHEST 1 VIEW: CPT | Mod: 26

## 2024-12-01 PROCEDURE — 36415 COLL VENOUS BLD VENIPUNCTURE: CPT

## 2024-12-01 PROCEDURE — 83605 ASSAY OF LACTIC ACID: CPT

## 2024-12-01 PROCEDURE — 74177 CT ABD & PELVIS W/CONTRAST: CPT | Mod: MC

## 2024-12-01 PROCEDURE — 93010 ELECTROCARDIOGRAM REPORT: CPT

## 2024-12-01 PROCEDURE — 96376 TX/PRO/DX INJ SAME DRUG ADON: CPT

## 2024-12-01 PROCEDURE — 85025 COMPLETE CBC W/AUTO DIFF WBC: CPT

## 2024-12-01 PROCEDURE — 81001 URINALYSIS AUTO W/SCOPE: CPT

## 2024-12-01 PROCEDURE — 83735 ASSAY OF MAGNESIUM: CPT

## 2024-12-01 PROCEDURE — 99285 EMERGENCY DEPT VISIT HI MDM: CPT | Mod: FS

## 2024-12-01 PROCEDURE — 96374 THER/PROPH/DIAG INJ IV PUSH: CPT | Mod: XU

## 2024-12-01 PROCEDURE — 93005 ELECTROCARDIOGRAM TRACING: CPT

## 2024-12-01 PROCEDURE — 84484 ASSAY OF TROPONIN QUANT: CPT

## 2024-12-01 PROCEDURE — 83690 ASSAY OF LIPASE: CPT

## 2024-12-01 RX ORDER — SODIUM CHLORIDE 9 MG/ML
1000 INJECTION, SOLUTION INTRAMUSCULAR; INTRAVENOUS; SUBCUTANEOUS ONCE
Refills: 0 | Status: COMPLETED | OUTPATIENT
Start: 2024-12-01 | End: 2024-12-01

## 2024-12-01 RX ORDER — ACETAMINOPHEN 500MG 500 MG/1
1000 TABLET, COATED ORAL ONCE
Refills: 0 | Status: COMPLETED | OUTPATIENT
Start: 2024-12-01 | End: 2024-12-01

## 2024-12-01 RX ORDER — OXYCODONE HYDROCHLORIDE 30 MG/1
10 TABLET ORAL ONCE
Refills: 0 | Status: DISCONTINUED | OUTPATIENT
Start: 2024-12-01 | End: 2024-12-01

## 2024-12-01 RX ORDER — ONDANSETRON HYDROCHLORIDE 4 MG/1
1 TABLET, FILM COATED ORAL
Qty: 15 | Refills: 0
Start: 2024-12-01 | End: 2024-12-05

## 2024-12-01 RX ORDER — DIAZEPAM 10 MG/1
10 TABLET ORAL ONCE
Refills: 0 | Status: DISCONTINUED | OUTPATIENT
Start: 2024-12-01 | End: 2024-12-01

## 2024-12-01 RX ORDER — CEFTRIAXONE SODIUM 1 G
1000 VIAL (EA) INJECTION ONCE
Refills: 0 | Status: DISCONTINUED | OUTPATIENT
Start: 2024-12-01 | End: 2024-12-01

## 2024-12-01 RX ORDER — CEFTRIAXONE SODIUM 1 G
1000 VIAL (EA) INJECTION ONCE
Refills: 0 | Status: COMPLETED | OUTPATIENT
Start: 2024-12-01 | End: 2024-12-01

## 2024-12-01 RX ORDER — POTASSIUM CHLORIDE 600 MG/1
20 TABLET, EXTENDED RELEASE ORAL ONCE
Refills: 0 | Status: COMPLETED | OUTPATIENT
Start: 2024-12-01 | End: 2024-12-01

## 2024-12-01 RX ORDER — ONDANSETRON HYDROCHLORIDE 4 MG/1
4 TABLET, FILM COATED ORAL ONCE
Refills: 0 | Status: COMPLETED | OUTPATIENT
Start: 2024-12-01 | End: 2024-12-01

## 2024-12-01 RX ORDER — CEPHALEXIN 500 MG
1 CAPSULE ORAL
Qty: 21 | Refills: 0
Start: 2024-12-01 | End: 2024-12-07

## 2024-12-01 RX ADMIN — SODIUM CHLORIDE 1000 MILLILITER(S): 9 INJECTION, SOLUTION INTRAMUSCULAR; INTRAVENOUS; SUBCUTANEOUS at 19:25

## 2024-12-01 RX ADMIN — ONDANSETRON HYDROCHLORIDE 4 MILLIGRAM(S): 4 TABLET, FILM COATED ORAL at 21:56

## 2024-12-01 RX ADMIN — DIAZEPAM 10 MILLIGRAM(S): 10 TABLET ORAL at 19:25

## 2024-12-01 RX ADMIN — Medication 1000 MILLIGRAM(S): at 21:02

## 2024-12-01 RX ADMIN — ONDANSETRON HYDROCHLORIDE 4 MILLIGRAM(S): 4 TABLET, FILM COATED ORAL at 17:49

## 2024-12-01 RX ADMIN — ACETAMINOPHEN 500MG 400 MILLIGRAM(S): 500 TABLET, COATED ORAL at 17:48

## 2024-12-01 RX ADMIN — SODIUM CHLORIDE 1000 MILLILITER(S): 9 INJECTION, SOLUTION INTRAMUSCULAR; INTRAVENOUS; SUBCUTANEOUS at 17:47

## 2024-12-01 RX ADMIN — POTASSIUM CHLORIDE 20 MILLIEQUIVALENT(S): 600 TABLET, EXTENDED RELEASE ORAL at 19:24

## 2024-12-01 RX ADMIN — OXYCODONE HYDROCHLORIDE 10 MILLIGRAM(S): 30 TABLET ORAL at 19:59

## 2024-12-01 NOTE — ED STATDOCS - NSICDXPASTSURGICALHX_GEN_ALL_CORE_FT
none
PAST SURGICAL HISTORY:  H/O lumpectomy     History of ankle surgery     S/P hysterectomy     S/P myomectomy

## 2024-12-01 NOTE — ED PROVIDER NOTE - PROGRESS NOTE DETAILS
Labs show white blood cell count elevated to 12.68.  Patient is hemoconcentrated, hemoglobin elevated to 16.9 hematocrit 49.6.  Platelets also elevated to 460.  Sodium mildly depleted at 132 potassium mildly decreased at 3.4.  BUN 12 creatinine 0.81 T. bili 0.5 AST 14 ALT 19 lipase 50 troponin not elevated at 5.  Lactate 1.7.  Urinalysis shows elevated specific gravity with trace ketone.  Swab is negative for flu COVID or RSV.  CT abdomen and pelvis shows no acute abnormality in the abdomen or pelvis and the lower chest shows no infiltrates.  There are 2 stable nodules in the left lower lobe.  No bowel obstruction no diverticulitis or appendicitis.  Second liter of IV fluid ordered for hydration.  Patient without vomiting or diarrhea in the ED.  Will p.o. challenge.  Ro Evans PA-C On reeval patient's heart rate had improved to 84 bpm status post Valium.  Patient is no longer tachycardic in the ED.  Patient was complaining of some chronic low back pain so p.o. oxycodone was given.  Urinalysis showed 34 white blood cells with small leuk esterase and no epithelial cells.  Patient denied any dysuria urinary frequency or urgency but will likely start p.o. antibiotics while waiting for culture.  Ro Evans PA-C Patient was given crackers with water in the ED and was able to take sips of liquid and eat the crackers without nausea or vomiting.  Patient passed p.o. challenge in the ED.  Ro Evans PA-C

## 2024-12-01 NOTE — ED PROVIDER NOTE - WR INTERPRETATION 1
Some increased lung markings in RLL, but no distinct ilnfiltrate or consolidtion.  Otherwise Clear lungs.

## 2024-12-01 NOTE — ED ADULT TRIAGE NOTE - CHIEF COMPLAINT QUOTE
Pt presents to the ED c.o weakness. Pt reports cough, weakness, nausea and vomiting for the past week, fevers of 102 yesterday. Endorses abdominal pain, no chest pain or SOB.

## 2024-12-01 NOTE — ED PROVIDER NOTE - CLINICAL SUMMARY MEDICAL DECISION MAKING FREE TEXT BOX
75 yo WF, PMH of DIS breast CA on po chemo, chronic back pain, sinus tachycardia, diverticular disease and pneumonia; BIB pvt car complaining of general weakness in association with several days and/V/D with poor appetite/p.o. intake.    Symptoms started 1 week ago with URI symptoms and dry cough, followed 1 to 2 days later with GI complaints.  No associated abdominal pain, chest pain, SOB, near syncope, LOC, dysuria.  No known ill contact. D 2 -3 x per day, watery.  No blood in V nor D.  + F 102 yesterday, not today.   Exam: mildly ill, non-toxic, mildly dehydrated.  Abd benign.    Plan: Labs incl: pan-culture, lactate, Urine, lipase, GI PCR if pt able, viral swab; CXR, CT A/P, IVF, IV Tylenol/Zofran.  Monitor, observe, reassess.

## 2024-12-01 NOTE — ED ADULT NURSE NOTE - OBJECTIVE STATEMENT
Pt presents to ED c/o generalized weakness, V/D for past 3 days, fever yesterday- 102F. Pt's  is at the bedside, states "She didn't take anything for the fever." PMH breast cancer.

## 2024-12-01 NOTE — ED PROVIDER NOTE - CARE PLAN
1 Principal Discharge DX:	Gastroenteritis  Secondary Diagnosis:	Acute UTI  Secondary Diagnosis:	Dehydration

## 2024-12-01 NOTE — ED ADULT NURSE REASSESSMENT NOTE - NS ED NURSE REASSESS COMMENT FT1
Report received from RICHARD Sykes. Pt resting comfortably in bed, denies any pain/ symptoms at this time, vital signs stable, meds given as ordered, Pt PO challenged ate crackers and water. Awaiting fluids to be completed and plan of care to be determined. Report received from RICHARD Sykes. Pt resting comfortably in bed, denies any pain/ symptoms at this time, vital signs stable, meds given as ordered, Pt PO challenged ate crackers and water. Pt unable to provided a stool sample at this time. Awaiting fluids to be completed and plan of care to be determined.

## 2024-12-01 NOTE — ED PROVIDER NOTE - NSFOLLOWUPINSTRUCTIONS_ED_ALL_ED_FT
GASTROENTERITIS - General Information    Gastroenteritis    WHAT YOU NEED TO KNOW:    What is gastroenteritis? Gastroenteritis, or stomach flu, is an infection of the stomach and intestines. Causes may include bacteria, parasites, viruses, chemicals, or reactions to medicines.  Digestive Tract    What increases my risk for gastroenteritis?    Close contact with an infected person or animal    Age older than 65    Travel to other countries    Eating spoiled food such as eggs, raw vegetables, shellfish, or meat that is not fully cooked    Drinking water that is not clean, such as when you camp or travel    Certain medicines, such as antibiotics, antacids, or chemotherapy    Conditions that weaken your immune system  What are the signs and symptoms of gastroenteritis?    Diarrhea or gas    Nausea, vomiting, or poor appetite    Abdominal cramps, pain, or gurgling    Fever    Tiredness or weakness    Headache or muscle aches  How is gastroenteritis diagnosed and treated? Your healthcare provider will ask about your medical history and symptoms. Your provider will examine you and check for signs of dehydration. Tell your provider how often you are vomiting or have diarrhea and what medicines you take. You may need a blood or bowel movement sample tested for the germ causing your gastroenteritis. Symptoms may go away without treatment. Medicines may be given to slow or stop your diarrhea or vomiting. You may also need medicines to treat an infection caused by bacteria or a parasite.    How can I manage my gastroenteritis?    Drink liquids as directed. Ask your healthcare provider how much liquid to drink each day, and which liquids are best for you. You may also need to drink an oral rehydration solution (ORS). An ORS has the right amounts of sugar, salt, and minerals in water to replace body fluids.    Eat bland foods. When you feel hungry, begin eating soft, bland foods. Examples are bananas, clear soup, potatoes, and applesauce. Do not have dairy products, alcohol, sugary drinks, or drinks with caffeine until you feel better. Avoid eating high-fat or fast foods.    Eat small, frequent meals throughout the day. Your stomach may tolerate small meals every 2 to 3 hours instead of 3 large meals.    Rest as much as possible. Slowly start to do more each day when you begin to feel better.  How can I prevent gastroenteritis? Gastroenteritis can spread easily. Keep yourself, your family, and your surroundings clean to help prevent the spread of gastroenteritis:    Wash your hands often. Use soap and water. Wash your hands after you use the bathroom, change a child's diapers, or sneeze. Wash your hands before you prepare or eat food.  Handwashing      Clean surfaces and do laundry often. Wash your clothes and towels separately from the rest of the laundry. Clean surfaces in your home with antibacterial  or bleach.    Clean food thoroughly and cook safely. Wash raw vegetables before you cook. Cook meat, fish, and eggs fully. Do not use the same dishes for raw meat as you do for other foods. Refrigerate any leftover food immediately.    Be aware when you camp or travel. Drink only clean water. Do not drink from rivers or lakes unless you purify or boil the water first. When you travel, drink bottled water and do not add ice. Do not eat fruit that has not been peeled. Do not eat raw fish or meat that is not fully cooked.  When should I seek immediate care?    You see blood in your vomit or diarrhea.    You cannot stop vomiting.    You have severe abdominal pain, or your abdomen is swollen.    You have not urinated for 12 hours.    You feel like you are going to faint.  When should I call my doctor?    You have a fever that does not go away.    You continue to vomit or have diarrhea, even after treatment.    You see worms in your diarrhea.    Your mouth or eyes are dry. You are not urinating as much or as often.    You have questions or concerns about your condition or care.  CARE AGREEMENT:    You have the right to help plan your care. Learn about your health condition and how it may be treated. Discuss treatment options with your healthcare providers to decide what care you want to receive. You always have the right to refuse treatment.    © [employer] 1973, 2024    	  back to top            © [employer] 1973, 2024                URINARY TRACT INFECTION IN OLDER ADULTS - General Information    Urinary Tract Infection in Older Adults    WHAT YOU NEED TO KNOW:    What is a urinary tract infection (UTI)? A UTI is caused by bacteria that get inside your urinary tract. Your urinary tract includes your kidneys, ureters, bladder, and urethra. A UTI is more common in your lower urinary tract, which includes your bladder and urethra.  Kidney, Ureters, Bladder    What increases my risk for a UTI?    A UTI within the last 3 months    Menopause in women    Enlarged prostate in men    Medicines that affect urination    Urinary incontinence (you cannot control your bladder)    Sexual intercourse    Medical conditions, such as diabetes or obesity    Urinary tract problems, such as a narrowing, kidney stones, or inability to empty your bladder completely  What are the signs and symptoms of a UTI?    Fever and chills    Pain or burning when you urinate    Urine that smells bad or looks cloudy, or blood in your urine    Urinating more often or waking from sleep to urinate    Sudden, strong need to urinate    Pain or pressure in your lower abdomen    Leaking urine    Confusion or agitation    Fatigue, shakiness, and weakness  How is a UTI diagnosed? Your healthcare provider will ask about your symptoms. He or she may also examine you. You may need any of the following:    A urinalysis will give information about your urinary tract and overall health.    A urine culture may show the type of germ causing the infection. You may need this test again if you continue to have signs and symptoms after a UTI is treated.  How is a UTI treated? Medicines treat the bacterial infection or decrease pain and burning when you urinate. You may also need medicines to decrease the urge to urinate often. If you have UTIs often (called recurrent UTIs), you may be given antibiotics to take regularly. You will be given directions for when and how to use antibiotics. The goal is to prevent UTIs but not cause antibiotic resistance by using antibiotics too often.    How can I manage my symptoms?    Drink liquids as directed. Liquids can help flush bacteria from your urinary tract. Ask how much liquid to drink each day and which liquids are best for you. You may need to drink more liquids than usual to help flush out the bacteria. Do not drink alcohol, caffeine, and citrus juices. These can irritate your bladder and increase your symptoms.    Apply heat on your abdomen for 20 to 30 minutes every 2 hours for as many days as directed. Heat helps decrease discomfort and pressure in your bladder.  How can I help prevent a UTI?    Urinate when you feel the urge. Do not hold your urine. Bacteria can grow if urine stays in the bladder too long. It may be helpful to urinate at least every 3 to 4 hours.    Urinate after you have sex. This will help flush away bacteria that can enter your urinary tract during sex.    Wear cotton underwear and clothes that are loose. Tight pants and nylon underwear can trap moisture and cause bacteria to grow.    Drink cranberry juice or take cranberry supplements. These may help prevent UTIs. Your healthcare provider can recommend the right juice or supplement for you.    Women should wipe front to back after urinating or having a bowel movement. This may prevent germs from getting into the urinary tract. Do not douche or use feminine deodorants. These can change the chemical balance in your vagina. You may also be given vaginal estrogen medicine. This medicine helps prevent recurrent UTIs in women who have gone through menopause or are in tres-menopause.  When should I seek immediate care?    You become confused or agitated.    You fall down.    You are urinating very little or not at all.    You are vomiting.    You have a high fever with shaking chills.    You have side or back pain that gets worse.  When should I call my doctor?    You have a fever.    You are a woman and you have increased white or yellow discharge from your vagina.    You do not feel better after 2 days of taking antibiotics.    You have questions or concerns about your condition or care.  CARE AGREEMENT:    You have the right to help plan your care. Learn about your health condition and how it may be treated. Discuss treatment options with your healthcare providers to decide what care you want to receive. You always have the right to refuse treatment.    © [employer] 1973, 2024    	  back to top            © [employer] 1973, 2024

## 2024-12-01 NOTE — ED ADULT NURSE REASSESSMENT NOTE - NS ED NURSE REASSESS COMMENT FT1
Attempted to DC pt. Pt  states "we can not go home I need ot change the sheets and go grocery shopping Attempted to DC pt. Pt  states "we can not go home I need ot change the sheets and go grocery shopping". Pt educated on to why pt is to be DC. RICHARD Cao, AZRA Starr, MD Billy made aware of pt  refusing DC of pt. RICHARD Cao at bedside with RICHARD Claudio. RICHARD Cao states to remove IV. Attempted to move IV  jumped in front of RICHARD Claudio, RICHARD Claudio quickly moved out of the way. Security called and situation deescalated. MD Billy spoke to pt and  again as to why it safe to DC pt.  still noncompliant at this time. RICHARD Cao and Nursing admin at bedside.

## 2024-12-01 NOTE — ED PROVIDER NOTE - NS ED ROS FT
ROS: Constitutional- Neg fever, Neg chills.  (+) Generalized weakness Respiratory- Neg cough, Neg SOB  Cardiac- no chest pain, no palpitations, ENT- No rhinorrhea, no sore throat, no congestion.  Abdomen- (+)nausea, (+) vomiting, (+) diarrhea.  Urinary- no dysuria, no urgency, no frequency.  Skin- No rashes

## 2024-12-01 NOTE — ED PROVIDER NOTE - PHYSICAL EXAMINATION
Constitutional: NAD AAOx3  Eyes: EOMI, pupils equal  Head: Normocephalic atraumatic  Mouth: no airway obstruction  Cardiac: (+) Tachycardia  Resp: Lungs CTAB  GI: Abd Round, active BS x4,soft, NT/ND.  Neg rebound or guarding.     Neuro: CN2-12 intact  Skin: No rashes Constitutional: NAD AAOx3  Eyes: EOMI, pupils equal  Head: Normocephalic atraumatic  Mouth: no airway obstruction  Cardiac: (+) Tachycardia  Resp: Lungs CTAB  GI: Abd Round, active BS x4,soft, NT/ND.  Neg rebound or guarding.     Neuro: CN2-12 intact  Skin: No rashes      RITA Billy MD:  Gen'l: Elderly WF adult, alert, mildly ill but non-toxic, no respiratory discomfort, no sentence shortening  Head: NC/AT  Eyes: PERRL, EOMI  ENT: O/P clear, mm mildly dry  CV: tachycardic, regular rhythm, normal radial pulse  Lungs: CTA, normal respirations  GI: soft, NT, BS hypoactive, normal pitch  : Deferred, no flank nor CVAT  Neck: NT, supple w/o pain  MSK: SINCLAIR x 4, no focal extremity swelling nor tenderness, B/L SLR 35 degrees w/o pain, normal motor  Skin: no tactile warmth, no rash  Neuro: A+O x 4, CN 2 -12 intact, normal speech, no focal motor/sensory deficits

## 2024-12-01 NOTE — ED PROVIDER NOTE - PATIENT PORTAL LINK FT
You can access the FollowMyHealth Patient Portal offered by [facility] by registering at the following website: [email or web address]. By joining [employer]’s FollowMyHealth portal, you will also be able to view your health information using other applications (apps) compatible with our system.

## 2024-12-01 NOTE — ED PROVIDER NOTE - OBJECTIVE STATEMENT
74-year-old female with past medical history of breast cancer, chronic back pain, Sinus tachycardia, diverticular disease and pneumonia presents to the ED complaining of 5 to 6 days of coughing associated with, On nausea vomiting and diarrhea.  Patient reports she last vomited 2 days ago patient having 2-3 episodes of diarrhea daily last episode this morning.  No blood in vomit or diarrhea.  Associated with fever that was 102 this morning, decreased appetite and generalized weakness. 74-year-old female with past medical history of breast cancer, chronic back pain, Sinus tachycardia, diverticular disease and pneumonia presents to the ED complaining of 5 to 6 days of coughing associated with, On nausea vomiting and diarrhea.  Patient reports she last vomited 2 days ago patient having 2-3 episodes of diarrhea daily last episode this morning.  No blood in vomit or diarrhea.  Associated with fever that was 102 this morning, decreased appetite and generalized weakness.    RITA Billy MD, ED Attdg:  Case d/w ED PA.  Pt seen/evaluated in ED.  73 yo WF, PMH of DIS breast CA on po chemo, chronic back pain, sinus tachycardia, diverticular disease and pneumonia; BIB pvt car complaining of general weakness in association with several days and/V/D with poor appetite/p.o. intake.    Symptoms started 1 week ago with URI symptoms and dry cough, followed 1 to 2 days later with GI complaints.  No associated abdominal pain, chest pain, SOB, near syncope, LOC, dysuria.  No known ill contact. D 2 -3 x per day, watery.  No blood in V nor D.  + F 102 yesterday, not today.   PCP: Jay

## 2024-12-01 NOTE — ED STATDOCS - PROGRESS NOTE DETAILS
Zamzam Burden for attending Dr. Zapata  75 y/o female with PMHx of L breast CA s/p lumpectomy, s/p hysterectomy, colon polyps, PNA, diverticular disease; presents to ED c/o nausea, vomiting, diarrhea weakness and difficulty sleeping x 1 week. Per family, pt febrile tmax 102 at 6AM today. She also endorses mild abd discomfort and cough. She is unable to tolerate PO and states she cannot take any of her meds. She is concerned she might have PNA and was hospitalized previously for it. Sending to the MAIN for further evaluation. Zamzam Burden for attending Dr. Zapata  75 y/o female with PMHx of L breast CA s/p lumpectomy, s/p hysterectomy, colon polyps, diverticular disease; presents to ED c/o nausea, vomiting, diarrhea, generalized weakness, cough and difficulty walking x1wk. Per family, pt febrile tmax 102 at 6AM today. She also endorses mild abd discomfort and diarrhea. She is unable to tolerate PO and states she cannot take any of her meds. She is concerned she might have PNA and was hospitalized previously for it. Sending to the MAIN for further evaluation.

## 2024-12-02 NOTE — ED POST DISCHARGE NOTE - DETAILS
Made aware, has appt with her PCP in 2 days. - Sandi Kong PA-C +UTI, keflex not seen on C&S, will switch to augmentin. Pt and  aware and advised to f/u with pmd. -Tamir Shafer PA-C

## 2024-12-04 LAB
-  AMPICILLIN: SIGNIFICANT CHANGE UP
-  CIPROFLOXACIN: SIGNIFICANT CHANGE UP
-  DAPTOMYCIN: SIGNIFICANT CHANGE UP
-  GENTAMICIN SYNERGY: SIGNIFICANT CHANGE UP
-  LEVOFLOXACIN: SIGNIFICANT CHANGE UP
-  LINEZOLID: SIGNIFICANT CHANGE UP
-  NITROFURANTOIN: SIGNIFICANT CHANGE UP
-  PENICILLIN: SIGNIFICANT CHANGE UP
-  RIFAMPIN: SIGNIFICANT CHANGE UP
-  STREPTOMYCIN SYNERGY: SIGNIFICANT CHANGE UP
-  TETRACYCLINE: SIGNIFICANT CHANGE UP
-  VANCOMYCIN: SIGNIFICANT CHANGE UP
CULTURE RESULTS: ABNORMAL
METHOD TYPE: SIGNIFICANT CHANGE UP
ORGANISM # SPEC MICROSCOPIC CNT: ABNORMAL
ORGANISM # SPEC MICROSCOPIC CNT: SIGNIFICANT CHANGE UP
SPECIMEN SOURCE: SIGNIFICANT CHANGE UP

## 2024-12-05 RX ORDER — AMOXICILLIN/POTASSIUM CLAV 250-125 MG
875 TABLET ORAL
Qty: 14 | Refills: 0
Start: 2024-12-05 | End: 2024-12-11
